# Patient Record
Sex: MALE | NOT HISPANIC OR LATINO | URBAN - METROPOLITAN AREA
[De-identification: names, ages, dates, MRNs, and addresses within clinical notes are randomized per-mention and may not be internally consistent; named-entity substitution may affect disease eponyms.]

---

## 2021-05-14 ENCOUNTER — IMMUNIZATIONS (OUTPATIENT)
Dept: FAMILY MEDICINE CLINIC | Facility: HOSPITAL | Age: 61
End: 2021-05-14

## 2021-05-14 DIAGNOSIS — Z23 ENCOUNTER FOR IMMUNIZATION: Primary | ICD-10-CM

## 2021-05-14 PROCEDURE — 91301 SARS-COV-2 / COVID-19 MRNA VACCINE (MODERNA) 100 MCG: CPT

## 2021-05-14 PROCEDURE — 0011A SARS-COV-2 / COVID-19 MRNA VACCINE (MODERNA) 100 MCG: CPT

## 2021-06-09 ENCOUNTER — IMMUNIZATIONS (OUTPATIENT)
Dept: FAMILY MEDICINE CLINIC | Facility: HOSPITAL | Age: 61
End: 2021-06-09

## 2021-06-09 DIAGNOSIS — Z23 ENCOUNTER FOR IMMUNIZATION: Primary | ICD-10-CM

## 2021-06-09 PROCEDURE — 91301 SARS-COV-2 / COVID-19 MRNA VACCINE (MODERNA) 100 MCG: CPT

## 2021-06-09 PROCEDURE — 0012A SARS-COV-2 / COVID-19 MRNA VACCINE (MODERNA) 100 MCG: CPT

## 2024-06-30 ENCOUNTER — HOSPITAL ENCOUNTER (INPATIENT)
Facility: HOSPITAL | Age: 64
LOS: 3 days | Discharge: HOME | DRG: 174 | End: 2024-07-03
Attending: EMERGENCY MEDICINE | Admitting: INTERNAL MEDICINE
Payer: COMMERCIAL

## 2024-06-30 ENCOUNTER — APPOINTMENT (EMERGENCY)
Dept: RADIOLOGY | Facility: HOSPITAL | Age: 64
DRG: 174 | End: 2024-06-30
Payer: COMMERCIAL

## 2024-06-30 DIAGNOSIS — I21.3 STEMI (ST ELEVATION MYOCARDIAL INFARCTION) (HCC): Primary | ICD-10-CM

## 2024-06-30 PROBLEM — I21.09 ACUTE MI ANTERIOR WALL FIRST EPISODE CARE (HCC): Status: ACTIVE | Noted: 2024-06-30

## 2024-06-30 LAB
2HR DELTA HS TROPONIN: ABNORMAL NG/L
ANION GAP SERPL CALCULATED.3IONS-SCNC: 8 MMOL/L (ref 4–13)
APTT PPP: 30 SECONDS (ref 23–37)
ATRIAL RATE: 64 BPM
ATRIAL RATE: 65 BPM
ATRIAL RATE: 67 BPM
BASOPHILS # BLD AUTO: 0.05 THOUSANDS/ÂΜL (ref 0–0.1)
BASOPHILS NFR BLD AUTO: 1 % (ref 0–1)
BUN SERPL-MCNC: 10 MG/DL (ref 5–25)
CALCIUM SERPL-MCNC: 9.8 MG/DL (ref 8.4–10.2)
CARDIAC TROPONIN I PNL SERPL HS: 111 NG/L
CARDIAC TROPONIN I PNL SERPL HS: ABNORMAL NG/L
CHLORIDE SERPL-SCNC: 104 MMOL/L (ref 96–108)
CHOLEST SERPL-MCNC: 232 MG/DL
CO2 SERPL-SCNC: 28 MMOL/L (ref 21–32)
CREAT SERPL-MCNC: 1.12 MG/DL (ref 0.6–1.3)
EOSINOPHIL # BLD AUTO: 0.03 THOUSAND/ÂΜL (ref 0–0.61)
EOSINOPHIL NFR BLD AUTO: 0 % (ref 0–6)
ERYTHROCYTE [DISTWIDTH] IN BLOOD BY AUTOMATED COUNT: 11 % (ref 11.6–15.1)
GFR SERPL CREATININE-BSD FRML MDRD: 69 ML/MIN/1.73SQ M
GLUCOSE SERPL-MCNC: 132 MG/DL (ref 65–140)
HCT VFR BLD AUTO: 46.9 % (ref 36.5–49.3)
HDLC SERPL-MCNC: 60 MG/DL
HGB BLD-MCNC: 16 G/DL (ref 12–17)
IMM GRANULOCYTES # BLD AUTO: 0.02 THOUSAND/UL (ref 0–0.2)
IMM GRANULOCYTES NFR BLD AUTO: 0 % (ref 0–2)
INR PPP: 1.03 (ref 0.84–1.19)
KCT BLD-ACNC: 229 SEC (ref 89–137)
KCT BLD-ACNC: 261 SEC (ref 89–137)
LDLC SERPL CALC-MCNC: 147 MG/DL (ref 0–100)
LYMPHOCYTES # BLD AUTO: 2.97 THOUSANDS/ÂΜL (ref 0.6–4.47)
LYMPHOCYTES NFR BLD AUTO: 38 % (ref 14–44)
MAGNESIUM SERPL-MCNC: 2 MG/DL (ref 1.9–2.7)
MCH RBC QN AUTO: 33.1 PG (ref 26.8–34.3)
MCHC RBC AUTO-ENTMCNC: 34.1 G/DL (ref 31.4–37.4)
MCV RBC AUTO: 97 FL (ref 82–98)
MONOCYTES # BLD AUTO: 0.67 THOUSAND/ÂΜL (ref 0.17–1.22)
MONOCYTES NFR BLD AUTO: 9 % (ref 4–12)
NEUTROPHILS # BLD AUTO: 4.15 THOUSANDS/ÂΜL (ref 1.85–7.62)
NEUTS SEG NFR BLD AUTO: 52 % (ref 43–75)
NONHDLC SERPL-MCNC: 172 MG/DL
NRBC BLD AUTO-RTO: 0 /100 WBCS
P AXIS: 68 DEGREES
P AXIS: 73 DEGREES
P AXIS: 77 DEGREES
PLATELET # BLD AUTO: 329 THOUSANDS/UL (ref 149–390)
PMV BLD AUTO: 9.9 FL (ref 8.9–12.7)
POTASSIUM SERPL-SCNC: 3.2 MMOL/L (ref 3.5–5.3)
PR INTERVAL: 152 MS
PR INTERVAL: 156 MS
PR INTERVAL: 167 MS
PROTHROMBIN TIME: 13.7 SECONDS (ref 11.6–14.5)
QRS AXIS: 35 DEGREES
QRS AXIS: 46 DEGREES
QRS AXIS: 85 DEGREES
QRSD INTERVAL: 84 MS
QRSD INTERVAL: 84 MS
QRSD INTERVAL: 88 MS
QT INTERVAL: 388 MS
QT INTERVAL: 392 MS
QT INTERVAL: 400 MS
QTC INTERVAL: 403 MS
QTC INTERVAL: 404 MS
QTC INTERVAL: 423 MS
RBC # BLD AUTO: 4.84 MILLION/UL (ref 3.88–5.62)
SODIUM SERPL-SCNC: 140 MMOL/L (ref 135–147)
SPECIMEN SOURCE: ABNORMAL
SPECIMEN SOURCE: ABNORMAL
T WAVE AXIS: 15 DEGREES
T WAVE AXIS: 25 DEGREES
T WAVE AXIS: 67 DEGREES
TRIGL SERPL-MCNC: 124 MG/DL
VENTRICULAR RATE: 64 BPM
VENTRICULAR RATE: 65 BPM
VENTRICULAR RATE: 67 BPM
WBC # BLD AUTO: 7.89 THOUSAND/UL (ref 4.31–10.16)

## 2024-06-30 PROCEDURE — 93010 ELECTROCARDIOGRAM REPORT: CPT | Performed by: INTERNAL MEDICINE

## 2024-06-30 PROCEDURE — C1769 GUIDE WIRE: HCPCS | Performed by: INTERNAL MEDICINE

## 2024-06-30 PROCEDURE — 36415 COLL VENOUS BLD VENIPUNCTURE: CPT

## 2024-06-30 PROCEDURE — 93458 L HRT ARTERY/VENTRICLE ANGIO: CPT | Performed by: INTERNAL MEDICINE

## 2024-06-30 PROCEDURE — 71045 X-RAY EXAM CHEST 1 VIEW: CPT

## 2024-06-30 PROCEDURE — 92941 PRQ TRLML REVSC TOT OCCL AMI: CPT | Performed by: INTERNAL MEDICINE

## 2024-06-30 PROCEDURE — 93005 ELECTROCARDIOGRAM TRACING: CPT

## 2024-06-30 PROCEDURE — C1725 CATH, TRANSLUMIN NON-LASER: HCPCS | Performed by: INTERNAL MEDICINE

## 2024-06-30 PROCEDURE — 96375 TX/PRO/DX INJ NEW DRUG ADDON: CPT

## 2024-06-30 PROCEDURE — 96374 THER/PROPH/DIAG INJ IV PUSH: CPT

## 2024-06-30 PROCEDURE — C9606 PERC D-E COR REVASC W AMI S: HCPCS | Performed by: INTERNAL MEDICINE

## 2024-06-30 PROCEDURE — 99223 1ST HOSP IP/OBS HIGH 75: CPT | Performed by: NURSE PRACTITIONER

## 2024-06-30 PROCEDURE — 85347 COAGULATION TIME ACTIVATED: CPT

## 2024-06-30 PROCEDURE — 99152 MOD SED SAME PHYS/QHP 5/>YRS: CPT | Performed by: INTERNAL MEDICINE

## 2024-06-30 PROCEDURE — 92978 ENDOLUMINL IVUS OCT C 1ST: CPT | Performed by: INTERNAL MEDICINE

## 2024-06-30 PROCEDURE — 83735 ASSAY OF MAGNESIUM: CPT

## 2024-06-30 PROCEDURE — C1753 CATH, INTRAVAS ULTRASOUND: HCPCS | Performed by: INTERNAL MEDICINE

## 2024-06-30 PROCEDURE — 85025 COMPLETE CBC W/AUTO DIFF WBC: CPT

## 2024-06-30 PROCEDURE — 96361 HYDRATE IV INFUSION ADD-ON: CPT

## 2024-06-30 PROCEDURE — 99285 EMERGENCY DEPT VISIT HI MDM: CPT

## 2024-06-30 PROCEDURE — C1894 INTRO/SHEATH, NON-LASER: HCPCS | Performed by: INTERNAL MEDICINE

## 2024-06-30 PROCEDURE — 85610 PROTHROMBIN TIME: CPT

## 2024-06-30 PROCEDURE — B2111ZZ FLUOROSCOPY OF MULTIPLE CORONARY ARTERIES USING LOW OSMOLAR CONTRAST: ICD-10-PCS | Performed by: INTERNAL MEDICINE

## 2024-06-30 PROCEDURE — C1874 STENT, COATED/COV W/DEL SYS: HCPCS | Performed by: INTERNAL MEDICINE

## 2024-06-30 PROCEDURE — 4A023N7 MEASUREMENT OF CARDIAC SAMPLING AND PRESSURE, LEFT HEART, PERCUTANEOUS APPROACH: ICD-10-PCS | Performed by: INTERNAL MEDICINE

## 2024-06-30 PROCEDURE — 99285 EMERGENCY DEPT VISIT HI MDM: CPT | Performed by: EMERGENCY MEDICINE

## 2024-06-30 PROCEDURE — 80061 LIPID PANEL: CPT

## 2024-06-30 PROCEDURE — 027034Z DILATION OF CORONARY ARTERY, ONE ARTERY WITH DRUG-ELUTING INTRALUMINAL DEVICE, PERCUTANEOUS APPROACH: ICD-10-PCS | Performed by: INTERNAL MEDICINE

## 2024-06-30 PROCEDURE — 80048 BASIC METABOLIC PNL TOTAL CA: CPT

## 2024-06-30 PROCEDURE — C1887 CATHETER, GUIDING: HCPCS | Performed by: INTERNAL MEDICINE

## 2024-06-30 PROCEDURE — 99153 MOD SED SAME PHYS/QHP EA: CPT | Performed by: INTERNAL MEDICINE

## 2024-06-30 PROCEDURE — 84484 ASSAY OF TROPONIN QUANT: CPT

## 2024-06-30 PROCEDURE — 99255 IP/OBS CONSLTJ NEW/EST HI 80: CPT | Performed by: INTERNAL MEDICINE

## 2024-06-30 PROCEDURE — 85730 THROMBOPLASTIN TIME PARTIAL: CPT

## 2024-06-30 DEVICE — STENT ONYXNG30026UX ONYX 3.00X26RX
Type: IMPLANTABLE DEVICE | Site: CORONARY | Status: FUNCTIONAL
Brand: ONYX FRONTIER™

## 2024-06-30 RX ORDER — SODIUM CHLORIDE 9 MG/ML
50 INJECTION, SOLUTION INTRAVENOUS CONTINUOUS
Status: DISCONTINUED | OUTPATIENT
Start: 2024-06-30 | End: 2024-06-30

## 2024-06-30 RX ORDER — CHLORHEXIDINE GLUCONATE ORAL RINSE 1.2 MG/ML
15 SOLUTION DENTAL EVERY 12 HOURS SCHEDULED
Status: DISCONTINUED | OUTPATIENT
Start: 2024-06-30 | End: 2024-07-01

## 2024-06-30 RX ORDER — SODIUM CHLORIDE 9 MG/ML
3 INJECTION INTRAVENOUS
Status: DISCONTINUED | OUTPATIENT
Start: 2024-06-30 | End: 2024-07-03 | Stop reason: HOSPADM

## 2024-06-30 RX ORDER — MAGNESIUM HYDROXIDE/ALUMINUM HYDROXICE/SIMETHICONE 120; 1200; 1200 MG/30ML; MG/30ML; MG/30ML
30 SUSPENSION ORAL EVERY 6 HOURS PRN
Status: DISCONTINUED | OUTPATIENT
Start: 2024-06-30 | End: 2024-07-03 | Stop reason: HOSPADM

## 2024-06-30 RX ORDER — VERAPAMIL HCL 2.5 MG/ML
AMPUL (ML) INTRAVENOUS CODE/TRAUMA/SEDATION MEDICATION
Status: DISCONTINUED | OUTPATIENT
Start: 2024-06-30 | End: 2024-06-30 | Stop reason: HOSPADM

## 2024-06-30 RX ORDER — ONDANSETRON 2 MG/ML
4 INJECTION INTRAMUSCULAR; INTRAVENOUS EVERY 6 HOURS PRN
Status: DISCONTINUED | OUTPATIENT
Start: 2024-06-30 | End: 2024-07-03 | Stop reason: HOSPADM

## 2024-06-30 RX ORDER — SODIUM CHLORIDE 9 MG/ML
75 INJECTION, SOLUTION INTRAVENOUS CONTINUOUS
Status: DISCONTINUED | OUTPATIENT
Start: 2024-06-30 | End: 2024-06-30

## 2024-06-30 RX ORDER — MORPHINE SULFATE 4 MG/ML
4 INJECTION, SOLUTION INTRAMUSCULAR; INTRAVENOUS ONCE
Status: COMPLETED | OUTPATIENT
Start: 2024-06-30 | End: 2024-06-30

## 2024-06-30 RX ORDER — LABETALOL HYDROCHLORIDE 5 MG/ML
INJECTION, SOLUTION INTRAVENOUS
Status: COMPLETED
Start: 2024-06-30 | End: 2024-06-30

## 2024-06-30 RX ORDER — NITROGLYCERIN 20 MG/100ML
INJECTION INTRAVENOUS
Status: COMPLETED | OUTPATIENT
Start: 2024-06-30 | End: 2024-06-30

## 2024-06-30 RX ORDER — NITROGLYCERIN 0.4 MG/1
0.4 TABLET SUBLINGUAL
Status: DISCONTINUED | OUTPATIENT
Start: 2024-06-30 | End: 2024-07-03 | Stop reason: HOSPADM

## 2024-06-30 RX ORDER — ASPIRIN 81 MG/1
81 TABLET, CHEWABLE ORAL DAILY
Status: DISCONTINUED | OUTPATIENT
Start: 2024-07-01 | End: 2024-06-30

## 2024-06-30 RX ORDER — METOPROLOL TARTRATE 1 MG/ML
5 INJECTION, SOLUTION INTRAVENOUS ONCE
Status: COMPLETED | OUTPATIENT
Start: 2024-06-30 | End: 2024-06-30

## 2024-06-30 RX ORDER — HEPARIN SODIUM 1000 [USP'U]/ML
INJECTION, SOLUTION INTRAVENOUS; SUBCUTANEOUS CODE/TRAUMA/SEDATION MEDICATION
Status: DISCONTINUED | OUTPATIENT
Start: 2024-06-30 | End: 2024-06-30 | Stop reason: HOSPADM

## 2024-06-30 RX ORDER — FENTANYL CITRATE 50 UG/ML
INJECTION, SOLUTION INTRAMUSCULAR; INTRAVENOUS CODE/TRAUMA/SEDATION MEDICATION
Status: DISCONTINUED | OUTPATIENT
Start: 2024-06-30 | End: 2024-06-30 | Stop reason: HOSPADM

## 2024-06-30 RX ORDER — LABETALOL HYDROCHLORIDE 5 MG/ML
10 INJECTION, SOLUTION INTRAVENOUS EVERY 6 HOURS PRN
Status: DISCONTINUED | OUTPATIENT
Start: 2024-06-30 | End: 2024-07-03 | Stop reason: HOSPADM

## 2024-06-30 RX ORDER — LIDOCAINE HYDROCHLORIDE 10 MG/ML
INJECTION, SOLUTION EPIDURAL; INFILTRATION; INTRACAUDAL; PERINEURAL CODE/TRAUMA/SEDATION MEDICATION
Status: DISCONTINUED | OUTPATIENT
Start: 2024-06-30 | End: 2024-06-30 | Stop reason: HOSPADM

## 2024-06-30 RX ORDER — NITROGLYCERIN 20 MG/100ML
INJECTION INTRAVENOUS CODE/TRAUMA/SEDATION MEDICATION
Status: DISCONTINUED | OUTPATIENT
Start: 2024-06-30 | End: 2024-06-30 | Stop reason: HOSPADM

## 2024-06-30 RX ORDER — POTASSIUM CHLORIDE 20 MEQ/1
20 TABLET, EXTENDED RELEASE ORAL ONCE
Status: COMPLETED | OUTPATIENT
Start: 2024-06-30 | End: 2024-06-30

## 2024-06-30 RX ORDER — FUROSEMIDE 10 MG/ML
INJECTION INTRAMUSCULAR; INTRAVENOUS CODE/TRAUMA/SEDATION MEDICATION
Status: DISCONTINUED | OUTPATIENT
Start: 2024-06-30 | End: 2024-06-30 | Stop reason: HOSPADM

## 2024-06-30 RX ORDER — ACETAMINOPHEN 325 MG/1
650 TABLET ORAL EVERY 8 HOURS PRN
Status: DISCONTINUED | OUTPATIENT
Start: 2024-06-30 | End: 2024-07-01

## 2024-06-30 RX ORDER — HEPARIN SODIUM 1000 [USP'U]/ML
4000 INJECTION, SOLUTION INTRAVENOUS; SUBCUTANEOUS ONCE
Status: COMPLETED | OUTPATIENT
Start: 2024-06-30 | End: 2024-06-30

## 2024-06-30 RX ORDER — ATORVASTATIN CALCIUM 80 MG/1
80 TABLET, FILM COATED ORAL DAILY
Status: DISCONTINUED | OUTPATIENT
Start: 2024-06-30 | End: 2024-07-03 | Stop reason: HOSPADM

## 2024-06-30 RX ORDER — MIDAZOLAM HYDROCHLORIDE 2 MG/2ML
INJECTION, SOLUTION INTRAMUSCULAR; INTRAVENOUS CODE/TRAUMA/SEDATION MEDICATION
Status: DISCONTINUED | OUTPATIENT
Start: 2024-06-30 | End: 2024-06-30 | Stop reason: HOSPADM

## 2024-06-30 RX ADMIN — METOROPROLOL TARTRATE 5 MG: 5 INJECTION, SOLUTION INTRAVENOUS at 17:46

## 2024-06-30 RX ADMIN — HEPARIN SODIUM 4000 UNITS: 1000 INJECTION INTRAVENOUS; SUBCUTANEOUS at 14:43

## 2024-06-30 RX ADMIN — SODIUM CHLORIDE 75 ML/HR: 0.9 INJECTION, SOLUTION INTRAVENOUS at 14:47

## 2024-06-30 RX ADMIN — TICAGRELOR 90 MG: 90 TABLET ORAL at 21:07

## 2024-06-30 RX ADMIN — POTASSIUM CHLORIDE 20 MEQ: 1500 TABLET, EXTENDED RELEASE ORAL at 17:46

## 2024-06-30 RX ADMIN — TICAGRELOR 180 MG: 90 TABLET ORAL at 14:44

## 2024-06-30 RX ADMIN — LABETALOL HYDROCHLORIDE 10 MG: 5 INJECTION, SOLUTION INTRAVENOUS at 23:17

## 2024-06-30 RX ADMIN — SODIUM CHLORIDE 50 ML/HR: 0.9 INJECTION, SOLUTION INTRAVENOUS at 17:30

## 2024-06-30 RX ADMIN — CHLORHEXIDINE GLUCONATE 15 ML: 1.2 RINSE ORAL at 21:08

## 2024-06-30 RX ADMIN — MORPHINE SULFATE 4 MG: 4 INJECTION INTRAVENOUS at 14:58

## 2024-06-30 RX ADMIN — METOPROLOL TARTRATE 25 MG: 25 TABLET, FILM COATED ORAL at 21:08

## 2024-06-30 RX ADMIN — ATORVASTATIN CALCIUM 80 MG: 80 TABLET, FILM COATED ORAL at 17:46

## 2024-06-30 NOTE — PROGRESS NOTES
Patient prepared for transfer via stretcher on the monitor to ICU-1.  Verbal report to be called to RN.  Patient offering no complaints at this time.  Right wrist dry and intact with 12ml of air in the band.  Site to be verified with receiving RN. Any changes from the above assessment will be documented by receiving RN.

## 2024-06-30 NOTE — PLAN OF CARE
Problem: PAIN - ADULT  Goal: Verbalizes/displays adequate comfort level or baseline comfort level  Description: Interventions:  - Encourage patient to monitor pain and request assistance  - Assess pain using appropriate pain scale  - Administer analgesics based on type and severity of pain and evaluate response  - Implement non-pharmacological measures as appropriate and evaluate response  - Consider cultural and social influences on pain and pain management  - Notify physician/advanced practitioner if interventions unsuccessful or patient reports new pain  Outcome: Progressing     Problem: INFECTION - ADULT  Goal: Absence or prevention of progression during hospitalization  Description: INTERVENTIONS:  - Assess and monitor for signs and symptoms of infection  - Monitor lab/diagnostic results  - Monitor all insertion sites, i.e. indwelling lines, tubes, and drains  - Monitor endotracheal if appropriate and nasal secretions for changes in amount and color  - [place] appropriate cooling/warming therapies per order  - Administer medications as ordered  - Instruct and encourage patient and family to use good hand hygiene technique  - Identify and instruct in appropriate isolation precautions for identified infection/condition  Outcome: Progressing  Goal: Absence of fever/infection during neutropenic period  Description: INTERVENTIONS:  - Monitor WBC    Outcome: Progressing     Problem: SAFETY ADULT  Goal: Patient will remain free of falls  Description: INTERVENTIONS:  - Educate patient/family on patient safety including physical limitations  - Instruct patient to call for assistance with activity   - Consult OT/PT to assist with strengthening/mobility   - Keep Call bell within reach  - Keep bed low and locked with side rails adjusted as appropriate  - Keep care items and personal belongings within reach  - Initiate and maintain comfort rounds  - Make Fall Risk Sign visible to staff  - Offer Toileting every 2 Hours,  in advance of need  - Initiate/Maintain bed alarm  - Obtain necessary fall risk management equipment:   - Apply yellow socks and bracelet for high fall risk patients  - Consider moving patient to room near nurses station  Outcome: Progressing  Goal: Maintain or return to baseline ADL function  Description: INTERVENTIONS:  -  Assess patient's ability to carry out ADLs; assess patient's baseline for ADL function and identify physical deficits which impact ability to perform ADLs (bathing, care of mouth/teeth, toileting, grooming, dressing, etc.)  - Assess/evaluate cause of self-care deficits   - Assess range of motion  - Assess patient's mobility; develop plan if impaired  - Assess patient's need for assistive devices and provide as appropriate  - Encourage maximum independence but intervene and supervise when necessary  - Involve family in performance of ADLs  - Assess for home care needs following discharge   - Consider OT consult to assist with ADL evaluation and planning for discharge  - Provide patient education as appropriate  Outcome: Progressing  Goal: Maintains/Returns to pre admission functional level  Description: INTERVENTIONS:  - Perform AM-PAC 6 Click Basic Mobility/ Daily Activity assessment daily.  - Set and communicate daily mobility goal to care team and patient/family/caregiver.   - Collaborate with rehabilitation services on mobility goals if consulted  - Perform Range of Motion 4 times a day.  - Reposition patient every 2 hours.  - Dangle patient 3 times a day  - Stand patient 3 times a day  - Ambulate patient 3 times a day  - Out of bed to chair 3 times a day   - Out of bed for meals 3 times a day  - Out of bed for toileting  - Record patient progress and toleration of activity level   Outcome: Progressing     Problem: DISCHARGE PLANNING  Goal: Discharge to home or other facility with appropriate resources  Description: INTERVENTIONS:  - Identify barriers to discharge w/patient and caregiver  -  Arrange for needed discharge resources and transportation as appropriate  - Identify discharge learning needs (meds, wound care, etc.)  - Arrange for interpretive services to assist at discharge as needed  - Refer to Case Management Department for coordinating discharge planning if the patient needs post-hospital services based on physician/advanced practitioner order or complex needs related to functional status, cognitive ability, or social support system  Outcome: Progressing     Problem: Knowledge Deficit  Goal: Patient/family/caregiver demonstrates understanding of disease process, treatment plan, medications, and discharge instructions  Description: Complete learning assessment and assess knowledge base.  Interventions:  - Provide teaching at level of understanding  - Provide teaching via preferred learning methods  Outcome: Progressing

## 2024-06-30 NOTE — H&P
[facility]  H&P  Name: Nolan Escobar 64 y.o. male I MRN: [id number]  Unit/Bed#: ICU 01 I Date of Admission: 6/30/2024   Date of Service: 6/30/2024 I Hospital Day: 0      Assessment & Plan   Acute MI anterior wall first episode care (HCC)  Assessment & Plan  Chest pressure after syncopal episode  ST elevation in the anterior leads  STEMI alert called  Patient was taken to the cardiac catheterization lab  He had a 100% proximal left anterior descending artery occlusion  PTCA was performed and drug-eluting stent deployed with flow restored  Admit patient to the intensive care unit  Continuous cardiopulmonary monitoring  Continue nitroglycerin drip  EKG with chest pain/pressure  Cardiology consult   TR band on right wrist with adequate hemostasis             History of Present Illness     HPI: Nolan Escobar is a 64 y.o. Emir[place]-speaking man with no past medical history who had an episode of chest pain at home today.  He then had a syncopal episode at home.  EMS was activated and the patient was transported to [facility] emergency department.  Upon arrival to the emergency department the patient was found to have ST elevations in his anterior leads with reciprocal ST depression.  He denied complaints of nausea, vomiting, dizziness, headache, visual disturbances, lower extremity edema.  Diaphoresis but did complain of chest discomfort.  A MI alert was called and the patient was taken to the cardiac catheterization lab where he was found to have a 100% proximal occlusion of the left anterior descending artery.  A drug-eluting stent was deployed and the patient was transferred to the intensive care unit.    Patient family members are in the room with him now and speak English and Emir[place].  Information is obtained through his family members.  Patient has no history of smoking.  He occasionally drinks alcohol but not often.  He has no past medical history he has been  healthy all of his life up until now.  He does not have a doctor here in [place] he recently moved here from [place].  He did not see a physician in the [place].    Patient arrives from the cardiac catheterization lab on a nitro drip mildly hypotensive therefore the drip was discontinued.  He denies any further complaints of chest pain.  He is having some difficulty urinating.  This may be secondary to his family member surrounding him in the bed he may need some privacy to urinate.    History obtained from the patient.  With family members acting as  since  Review of Systems: See HPI for Review of Systems  Disposition: Critical care  Historical Information   No past medical history on file. No past surgical history on file.   No current outpatient medications No Known Allergies     No family history on file.       Objective                            Vitals I/O      Most Recent Min/Max in 24hrs   Temp 97.6 °F (36.4 °C) Temp  Min: 97.6 °F (36.4 °C)  Max: 98.4 °F (36.9 °C)   Pulse 68 Pulse  Min: 62  Max: 68   Resp 18 Resp  Min: 18  Max: 24   /97 BP  Min: 119/85  Max: 180/110   O2 Sat 95 % SpO2  Min: 94 %  Max: 98 %    No intake or output data in the 24 hours ending 06/30/24 1710    No diet orders on file    Invasive Monitoring           Physical Exam   Physical Exam  Vitals reviewed.   Eyes:      General: Vision grossly intact.      Extraocular Movements: Extraocular movements intact.      Conjunctiva/sclera: Conjunctivae normal.      Pupils: Pupils are equal, round, and reactive to light.   Skin:     General: Skin is warm and dry.      Capillary Refill: Capillary refill takes less than 2 seconds.   HENT:      Head: Normocephalic and atraumatic.      Ears:      Comments: Ears are normal     Nose:      Comments: Nose is normal     Mouth/Throat:      Mouth: Mucous membranes are moist.   Neck:      Comments: Supple no JVD no lymphadenopathy trachea  midline  Cardiovascular:      Rate and Rhythm: Normal rate and regular rhythm.      Pulses: Normal pulses.      Heart sounds: Normal heart sounds.   Musculoskeletal:         General: Normal range of motion.   Abdominal: General: Bowel sounds are normal.      Palpations: Abdomen is soft.   Constitutional:       Appearance: He is well-developed and well-nourished.   Pulmonary:      Effort: Pulmonary effort is normal.      Breath sounds: Normal breath sounds.   Neurological:      General: No focal deficit present.      Mental Status: He is alert and oriented to person, place and time. Mental status is at baseline. He is calm.      Sensory: Sensation is intact.      Motor: gross motor function is at baseline for patient. Strength full and intact in all extremities.        Corneal reflex present, cough reflex and gag reflex intact.            Diagnostic Studies      EKG: Sinus rhythm  Imaging: Images from the cardiac catheterization lab have been reviewed and I have personally reviewed pertinent reports.       Medications:  Scheduled PRN   atorvastatin, 80 mg, Daily  potassium chloride, 20 mEq, Once  [START ON 7/1/2024] ticagrelor, 90 mg, Q12H MATTEO      sodium chloride (PF), 3 mL, Q1H PRN       Continuous    sodium chloride, 75 mL/hr, Last Rate: 75 mL/hr (06/30/24 1447)         Labs:    CBC    Recent Labs     06/30/24  1446   WBC 7.89   HGB 16.0   HCT 46.9        BMP    Recent Labs     06/30/24  1446   SODIUM 140   K 3.2*      CO2 28   AGAP 8   BUN 10   CREATININE 1.12   CALCIUM 9.8       Coags    Recent Labs     06/30/24  1446   INR 1.03   PTT 30        Additional Electrolytes  Recent Labs     06/30/24  1446   MG 2.0          Blood Gas    No recent results  No recent results LFTs  No recent results    Infectious  No recent results  Glucose  Recent Labs     06/30/24  1446   GLUC 132             Anticipated Length of Stay is > 2 midnights  [place]

## 2024-06-30 NOTE — CONSULTS
Cardiology Consultation  MD Onel Wagner MD, [facility]  Michael Baugh DO, [facility]  MD Ester Laird DO, [facility]  Arnaldo Carlin DO, [facility]  ----------------------------------------------------------------  [address] [address]    Nolan Escobar 64 y.o. male MRN: [id number]  Unit/Bed#: ICU 01 Encounter: [id number]      Reason for Consultation: STEMI      ASSESSMENT:   Acute anterior ST segment elevation MI  Accelerated hypertension  Dyslipidemia w/ LDL 47 mg/dL, HDL 60 mg/dL,  mg/dL, June 2024  Hypokalemia    PLAN:  Patient has ST segment elevation MI  Recommend emergent cardiac catheterization  Give aspirin, high intensity statin, heparin and Brilinta  Risks, benefits and alternatives to cardiac catheterization have been discussed at length including the risk of bleeding, infection, renal failure, CVA, myocardial infarction and death; patient understands these risks and wishes to proceed.  N.p.o. for the procedure  Check 2D echocardiogram to assess cardiac structure and function  Postcatheterization, would assess feasibility of giving beta-blocker  Further recommendations to follow testing    Signed: Michael Baugh DO, [facility], [place]ORA GUERRERO      History of Present Illness:  Nolan Escobar is a 64 y.o. male with no significant past medical history presented with acute onset chest discomfort.  Symptoms began 1.5 hours prior to admission on June 30, 2024.  The discomfort was described as a pressure sensation in the mid sternum.  The symptoms were severe in nature.  Due to his symptoms, he presented to [facility] by ambulance.  The patient was found to have ST segment elevation in the anterolateral leads on ECG and MI alert was called. In the emergency department, the patient was given heparin bolus, aspirin, high intensity statin and loaded with Brilinta.  He continues to have active chest pain.  Blood  pressure was elevated.      Review of Systems:  Review of Systems   Constitutional: Negative for decreased appetite, fever, weight gain and weight loss.   HENT:  Negative for congestion and sore throat.    Eyes:  Negative for visual disturbance.   Cardiovascular:  Negative for chest pain, dyspnea on exertion, leg swelling, near-syncope and palpitations.   Respiratory:  Negative for cough and shortness of breath.    Hematologic/Lymphatic: Negative for bleeding problem.   Skin:  Negative for rash.   Musculoskeletal:  Negative for myalgias and neck pain.   Gastrointestinal:  Negative for abdominal pain and nausea.   Neurological:  Negative for light-headedness and weakness.   Psychiatric/Behavioral:  Negative for depression.          No past medical history on file.    No past surgical history on file.    Social History     Socioeconomic History    Marital status: Single     Spouse name: Not on file    Number of children: Not on file    Years of education: Not on file    Highest education level: Not on file   Occupational History    Not on file   Tobacco Use    Smoking status: Not on file    Smokeless tobacco: Not on file   Substance and Sexual Activity    Alcohol use: Not on file    Drug use: Not on file    Sexual activity: Not on file   Other Topics Concern    Not on file   Social History Narrative    Not on file     Social Determinants of Health     Financial Resource Strain: Not on file   Food Insecurity: Not on file   Transportation Needs: Not on file   Physical Activity: Not on file   Stress: Not on file   Social Connections: Not on file   Intimate Partner Violence: Not on file   Housing Stability: Not on file       No family history on file.    No Known Allergies    No current facility-administered medications on file prior to encounter.     No current outpatient medications on file prior to encounter.        Current Facility-Administered Medications   Medication Dose Route Frequency Provider Last Rate     "atorvastatin  80 mg Oral Daily Michael Baugh,       potassium chloride  20 mEq Oral Once Michael Baugh DO      sodium chloride (PF)  3 mL Intravenous Q1H PRN Oralia Galdamez MD      sodium chloride  75 mL/hr Intravenous Continuous Oralia Galdamez MD 75 mL/hr (06/30/24 1447)    [START ON 7/1/2024] ticagrelor  90 mg Oral Q12H MATTEO Oralia Galdamez MD         sodium chloride, 75 mL/hr, Last Rate: 75 mL/hr (06/30/24 1447)        Vitals:    06/30/24 1630 06/30/24 1639 06/30/24 1700 06/30/24 1715   BP: 119/85 144/97 (!) 149/108 (!) 162/112   BP Location: Left arm      Pulse: 68 68 66 64   Resp: 20 18 (!) 11 18   Temp: 97.6 °F (36.4 °C)      TempSrc: Oral      SpO2:  95% 98% 98%   Weight:  70.7 kg (155 lb 13.8 oz)     Height:  5' 10\" (1.778 m)         No intake/output data recorded.    No intake or output data in the 24 hours ending 06/30/24 1718    Weight change:     PHYSICAL EXAMINATION:  Gen: Awake, Alert, NAD  Head/eyes: AT/NC, pupils equal and round, Anicteric  ENT: mmm  Neck: Supple, No elevated JVP, trachea midline  Resp: CTA bilaterally no w/r/r  CV: RRR +S1, S2, No m/r/g  Abd: Soft, NT/ND + BS  Ext: no LE edema bilaterally  Neuro: Follows commands, moves all extermities  Psych: Appropriate affect, normal mood, pleasant attitude, non-combative  Skin: warm; no rash, erythema or venous stasis changes on exposed skin    Lab Results:  Results from last 7 days   Lab Units 06/30/24  1446   WBC Thousand/uL 7.89   HEMOGLOBIN g/dL 16.0   HEMATOCRIT % 46.9   PLATELETS Thousands/uL 329     Results from last 7 days   Lab Units 06/30/24  1446   POTASSIUM mmol/L 3.2*   CHLORIDE mmol/L 104   CO2 mmol/L 28   BUN mg/dL 10   CREATININE mg/dL 1.12   CALCIUM mg/dL 9.8     No results found for: \"TROPONINT\"      Results from last 7 days   Lab Units 06/30/24  1446   HS TNI 0HR ng/L 111*         Results from last 7 days   Lab Units 06/30/24  1446   INR  1.03           No results found for this or any previous " visit.    No results found for this or any previous visit.    No results found for this or any previous visit.    No results found for this or any previous visit.      CXR: No results found for this or any previous visit.    No results found for this or any previous visit.      ECG: Sinus rhythm 64 bpm with acute anterolateral injury with inferior reciprocal changes    This note was completed in part utilizing M-Modal Fluency Direct Software.  Grammatical errors, random word insertions, spelling mistakes, and incomplete sentences may be an occasional consequence of this system secondary to software limitations, ambient noise, and hardware issues.  If you have any questions or concerns about the content, text, or information contained within the body of this dictation, please contact the provider for clarification.

## 2024-06-30 NOTE — ED ATTENDING ATTESTATION
6/30/2024  I, Que Wagner MD, saw and evaluated the patient. I have discussed the patient with the resident/non-physician practitioner and agree with the resident's/non-physician practitioner's findings, Plan of Care, and MDM as documented in the resident's/non-physician practitioner's note, except where noted. All available labs and Radiology studies were reviewed.  I was present for key portions of any procedure(s) performed by the resident/non-physician practitioner and I was immediately available to provide assistance.       At this point I agree with the current assessment done in the Emergency Department.  I have conducted an independent evaluation of this patient a history and physical is as follows:  64-year-old Divehi-speaking male presents for evaluation of prehospital STEMI alert.  Patient had sudden onset of substernal chest pressure after having a syncopal episode.  No other complaints.  Patient was given aspirin and nitro prehospital.  Systems reviewed otherwise negative.  On exam no distress, lungs normal cardiac bilateral.  [id number] alert called, will send to Cath Lab, acute MI protocol  ED Course         Critical Care Time  Procedures

## 2024-06-30 NOTE — LETTER
[place] [id number]  [address] [facility] [address]  Dept: [phone]    July 3, 2024     Patient: Nolan Escobar   YOB: 1960   Date of Visit: 6/30/2024       To Whom it May Concern:    Nolan Escobar is under my professional care. He was seen in the hospital from 6/30/2024 to 07/03/24. He may possibly be able to return to work by 10/3/2024 - He is pending cardiac clearance and this will be addressed at next follow up      If you have any questions or concerns, please don't hesitate to call.         Sincerely,          Sly Alston, DO

## 2024-06-30 NOTE — LETTER
[place] [address]  [address] [facility] [address]  Dept: [phone]    July 3, 2024     Patient: Nolan Escobar   YOB: 1960   Date of Visit: 6/30/2024       To Whom it May Concern:    Nolan Escobar is under my professional care. He was seen in the hospital from 6/30/2024 to 07/03/24. He may return to work on 7/11/2024 without limitations.    If you have any questions or concerns, please don't hesitate to call.         Sincerely,      //Digital Signature//      Sly Alston DO

## 2024-06-30 NOTE — ASSESSMENT & PLAN NOTE
Chest pressure after syncopal episode  ST elevation in the anterior leads  STEMI alert called  Patient was taken to the cardiac catheterization lab  He had a 100% proximal left anterior descending artery occlusion  PTCA was performed and drug-eluting stent deployed with flow restored  Admit patient to the intensive care unit  Continuous cardiopulmonary monitoring  Continue nitroglycerin drip  EKG with chest pain/pressure  Cardiology consult   TR band on right wrist with adequate hemostasis

## 2024-07-01 ENCOUNTER — APPOINTMENT (INPATIENT)
Dept: NON INVASIVE DIAGNOSTICS | Facility: HOSPITAL | Age: 64
DRG: 174 | End: 2024-07-01
Payer: COMMERCIAL

## 2024-07-01 PROBLEM — I10 HYPERTENSION: Status: ACTIVE | Noted: 2024-07-01

## 2024-07-01 PROBLEM — E87.6 HYPOKALEMIA: Status: ACTIVE | Noted: 2024-07-01

## 2024-07-01 PROBLEM — I21.02 ACUTE ST ELEVATION MYOCARDIAL INFARCTION (STEMI) INVOLVING LEFT ANTERIOR DESCENDING (LAD) CORONARY ARTERY (HCC): Status: ACTIVE | Noted: 2024-06-30

## 2024-07-01 PROBLEM — E78.5 DYSLIPIDEMIA: Status: ACTIVE | Noted: 2024-07-01

## 2024-07-01 LAB
ANION GAP SERPL CALCULATED.3IONS-SCNC: 7 MMOL/L (ref 4–13)
ANION GAP SERPL CALCULATED.3IONS-SCNC: 8 MMOL/L (ref 4–13)
ATRIAL RATE: 89 BPM
BASOPHILS # BLD AUTO: 0.02 THOUSANDS/ÂΜL (ref 0–0.1)
BASOPHILS NFR BLD AUTO: 0 % (ref 0–1)
BSA FOR ECHO PROCEDURE: 1.87 M2
BUN SERPL-MCNC: 11 MG/DL (ref 5–25)
BUN SERPL-MCNC: 16 MG/DL (ref 5–25)
CA-I BLD-SCNC: 1.17 MMOL/L (ref 1.12–1.32)
CA-I BLD-SCNC: 1.21 MMOL/L (ref 1.12–1.32)
CALCIUM SERPL-MCNC: 10.6 MG/DL (ref 8.4–10.2)
CALCIUM SERPL-MCNC: 6.4 MG/DL (ref 8.4–10.2)
CARDIAC TROPONIN I PNL SERPL HS: ABNORMAL NG/L (ref 8–18)
CHLORIDE SERPL-SCNC: 114 MMOL/L (ref 96–108)
CHLORIDE SERPL-SCNC: 99 MMOL/L (ref 96–108)
CO2 SERPL-SCNC: 18 MMOL/L (ref 21–32)
CO2 SERPL-SCNC: 28 MMOL/L (ref 21–32)
CREAT SERPL-MCNC: 0.62 MG/DL (ref 0.6–1.3)
CREAT SERPL-MCNC: 0.83 MG/DL (ref 0.6–1.3)
EOSINOPHIL # BLD AUTO: 0 THOUSAND/ÂΜL (ref 0–0.61)
EOSINOPHIL NFR BLD AUTO: 0 % (ref 0–6)
ERYTHROCYTE [DISTWIDTH] IN BLOOD BY AUTOMATED COUNT: 11.3 % (ref 11.6–15.1)
GFR SERPL CREATININE-BSD FRML MDRD: 104 ML/MIN/1.73SQ M
GFR SERPL CREATININE-BSD FRML MDRD: 92 ML/MIN/1.73SQ M
GLOBAL LONGITUIDAL STRAIN: -6 %
GLUCOSE SERPL-MCNC: 129 MG/DL (ref 65–140)
GLUCOSE SERPL-MCNC: 129 MG/DL (ref 65–140)
HCT VFR BLD AUTO: 48.4 % (ref 36.5–49.3)
HGB BLD-MCNC: 16.4 G/DL (ref 12–17)
IMM GRANULOCYTES # BLD AUTO: 0.06 THOUSAND/UL (ref 0–0.2)
IMM GRANULOCYTES NFR BLD AUTO: 0 % (ref 0–2)
LYMPHOCYTES # BLD AUTO: 1.22 THOUSANDS/ÂΜL (ref 0.6–4.47)
LYMPHOCYTES NFR BLD AUTO: 8 % (ref 14–44)
MAGNESIUM SERPL-MCNC: 1.3 MG/DL (ref 1.9–2.7)
MAGNESIUM SERPL-MCNC: 3.3 MG/DL (ref 1.9–2.7)
MCH RBC QN AUTO: 32.7 PG (ref 26.8–34.3)
MCHC RBC AUTO-ENTMCNC: 33.9 G/DL (ref 31.4–37.4)
MCV RBC AUTO: 96 FL (ref 82–98)
MONOCYTES # BLD AUTO: 0.75 THOUSAND/ÂΜL (ref 0.17–1.22)
MONOCYTES NFR BLD AUTO: 5 % (ref 4–12)
NEUTROPHILS # BLD AUTO: 13.07 THOUSANDS/ÂΜL (ref 1.85–7.62)
NEUTS SEG NFR BLD AUTO: 87 % (ref 43–75)
NRBC BLD AUTO-RTO: 0 /100 WBCS
P AXIS: 51 DEGREES
PHOSPHATE SERPL-MCNC: 2.1 MG/DL (ref 2.3–4.1)
PHOSPHATE SERPL-MCNC: 2.8 MG/DL (ref 2.3–4.1)
PLATELET # BLD AUTO: 356 THOUSANDS/UL (ref 149–390)
PMV BLD AUTO: 10.3 FL (ref 8.9–12.7)
POTASSIUM SERPL-SCNC: 3 MMOL/L (ref 3.5–5.3)
POTASSIUM SERPL-SCNC: 4.9 MMOL/L (ref 3.5–5.3)
PR INTERVAL: 146 MS
QRS AXIS: 69 DEGREES
QRSD INTERVAL: 83 MS
QT INTERVAL: 338 MS
QTC INTERVAL: 412 MS
RBC # BLD AUTO: 5.02 MILLION/UL (ref 3.88–5.62)
SL CV LV EF: 31
SODIUM SERPL-SCNC: 135 MMOL/L (ref 135–147)
SODIUM SERPL-SCNC: 139 MMOL/L (ref 135–147)
T WAVE AXIS: 57 DEGREES
VENTRICULAR RATE: 89 BPM
WBC # BLD AUTO: 15.12 THOUSAND/UL (ref 4.31–10.16)

## 2024-07-01 PROCEDURE — 85025 COMPLETE CBC W/AUTO DIFF WBC: CPT | Performed by: NURSE PRACTITIONER

## 2024-07-01 PROCEDURE — 93306 TTE W/DOPPLER COMPLETE: CPT

## 2024-07-01 PROCEDURE — 99232 SBSQ HOSP IP/OBS MODERATE 35: CPT | Performed by: INTERNAL MEDICINE

## 2024-07-01 PROCEDURE — 93356 MYOCRD STRAIN IMG SPCKL TRCK: CPT

## 2024-07-01 PROCEDURE — NC001 PR NO CHARGE: Performed by: INTERNAL MEDICINE

## 2024-07-01 PROCEDURE — 93010 ELECTROCARDIOGRAM REPORT: CPT | Performed by: INTERNAL MEDICINE

## 2024-07-01 PROCEDURE — 80048 BASIC METABOLIC PNL TOTAL CA: CPT | Performed by: INTERNAL MEDICINE

## 2024-07-01 PROCEDURE — 83735 ASSAY OF MAGNESIUM: CPT

## 2024-07-01 PROCEDURE — 84484 ASSAY OF TROPONIN QUANT: CPT | Performed by: NURSE PRACTITIONER

## 2024-07-01 PROCEDURE — 80048 BASIC METABOLIC PNL TOTAL CA: CPT

## 2024-07-01 PROCEDURE — 83036 HEMOGLOBIN GLYCOSYLATED A1C: CPT

## 2024-07-01 PROCEDURE — 84100 ASSAY OF PHOSPHORUS: CPT

## 2024-07-01 PROCEDURE — 82330 ASSAY OF CALCIUM: CPT

## 2024-07-01 PROCEDURE — 93005 ELECTROCARDIOGRAM TRACING: CPT

## 2024-07-01 RX ORDER — MAGNESIUM SULFATE HEPTAHYDRATE 40 MG/ML
2 INJECTION, SOLUTION INTRAVENOUS ONCE
Status: COMPLETED | OUTPATIENT
Start: 2024-07-01 | End: 2024-07-01

## 2024-07-01 RX ORDER — POLYETHYLENE GLYCOL 3350 17 G/17G
17 POWDER, FOR SOLUTION ORAL DAILY
Status: DISCONTINUED | OUTPATIENT
Start: 2024-07-01 | End: 2024-07-03 | Stop reason: HOSPADM

## 2024-07-01 RX ORDER — ACETAMINOPHEN 325 MG/1
975 TABLET ORAL EVERY 6 HOURS PRN
Status: DISCONTINUED | OUTPATIENT
Start: 2024-07-01 | End: 2024-07-03 | Stop reason: HOSPADM

## 2024-07-01 RX ORDER — POTASSIUM CHLORIDE 14.9 MG/ML
20 INJECTION INTRAVENOUS ONCE
Status: DISCONTINUED | OUTPATIENT
Start: 2024-07-01 | End: 2024-07-01

## 2024-07-01 RX ORDER — LOSARTAN POTASSIUM 25 MG/1
25 TABLET ORAL DAILY
Status: DISCONTINUED | OUTPATIENT
Start: 2024-07-01 | End: 2024-07-03 | Stop reason: HOSPADM

## 2024-07-01 RX ORDER — AMLODIPINE BESYLATE 5 MG/1
5 TABLET ORAL DAILY
Status: DISCONTINUED | OUTPATIENT
Start: 2024-07-01 | End: 2024-07-03 | Stop reason: HOSPADM

## 2024-07-01 RX ORDER — POTASSIUM CHLORIDE 20 MEQ/1
40 TABLET, EXTENDED RELEASE ORAL ONCE
Status: COMPLETED | OUTPATIENT
Start: 2024-07-01 | End: 2024-07-01

## 2024-07-01 RX ORDER — AMLODIPINE BESYLATE 5 MG/1
5 TABLET ORAL DAILY
Status: DISCONTINUED | OUTPATIENT
Start: 2024-07-01 | End: 2024-07-01

## 2024-07-01 RX ORDER — METOPROLOL SUCCINATE 25 MG/1
25 TABLET, EXTENDED RELEASE ORAL 2 TIMES DAILY
Status: DISCONTINUED | OUTPATIENT
Start: 2024-07-01 | End: 2024-07-03 | Stop reason: HOSPADM

## 2024-07-01 RX ORDER — MORPHINE SULFATE 4 MG/ML
4 INJECTION, SOLUTION INTRAMUSCULAR; INTRAVENOUS ONCE
Status: COMPLETED | OUTPATIENT
Start: 2024-07-01 | End: 2024-07-01

## 2024-07-01 RX ORDER — LISINOPRIL 10 MG/1
10 TABLET ORAL DAILY
Status: DISCONTINUED | OUTPATIENT
Start: 2024-07-01 | End: 2024-07-01

## 2024-07-01 RX ORDER — COLCHICINE 0.6 MG/1
0.6 TABLET ORAL 2 TIMES DAILY
Status: DISCONTINUED | OUTPATIENT
Start: 2024-07-01 | End: 2024-07-03 | Stop reason: HOSPADM

## 2024-07-01 RX ORDER — LIDOCAINE 50 MG/G
1 PATCH TOPICAL DAILY
Status: DISCONTINUED | OUTPATIENT
Start: 2024-07-01 | End: 2024-07-03 | Stop reason: HOSPADM

## 2024-07-01 RX ORDER — CALCIUM GLUCONATE 20 MG/ML
2 INJECTION, SOLUTION INTRAVENOUS ONCE
Status: COMPLETED | OUTPATIENT
Start: 2024-07-01 | End: 2024-07-01

## 2024-07-01 RX ORDER — ENOXAPARIN SODIUM 100 MG/ML
40 INJECTION SUBCUTANEOUS
Status: DISCONTINUED | OUTPATIENT
Start: 2024-07-01 | End: 2024-07-03 | Stop reason: HOSPADM

## 2024-07-01 RX ORDER — MAGNESIUM SULFATE HEPTAHYDRATE 40 MG/ML
4 INJECTION, SOLUTION INTRAVENOUS ONCE
Status: COMPLETED | OUTPATIENT
Start: 2024-07-01 | End: 2024-07-01

## 2024-07-01 RX ORDER — POTASSIUM CHLORIDE 14.9 MG/ML
20 INJECTION INTRAVENOUS ONCE
Status: COMPLETED | OUTPATIENT
Start: 2024-07-01 | End: 2024-07-01

## 2024-07-01 RX ORDER — DOCUSATE SODIUM 100 MG/1
100 CAPSULE, LIQUID FILLED ORAL 2 TIMES DAILY
Status: DISCONTINUED | OUTPATIENT
Start: 2024-07-01 | End: 2024-07-03 | Stop reason: HOSPADM

## 2024-07-01 RX ADMIN — COLCHICINE 0.6 MG: 0.6 TABLET ORAL at 17:16

## 2024-07-01 RX ADMIN — LOSARTAN POTASSIUM 25 MG: 25 TABLET, FILM COATED ORAL at 10:40

## 2024-07-01 RX ADMIN — CHLORHEXIDINE GLUCONATE 15 ML: 1.2 RINSE ORAL at 08:47

## 2024-07-01 RX ADMIN — LIDOCAINE 1 PATCH: 700 PATCH TOPICAL at 08:47

## 2024-07-01 RX ADMIN — POTASSIUM CHLORIDE 40 MEQ: 1500 TABLET, EXTENDED RELEASE ORAL at 10:40

## 2024-07-01 RX ADMIN — AMLODIPINE BESYLATE 5 MG: 5 TABLET ORAL at 15:27

## 2024-07-01 RX ADMIN — METOPROLOL SUCCINATE 25 MG: 25 TABLET, FILM COATED, EXTENDED RELEASE ORAL at 20:20

## 2024-07-01 RX ADMIN — ACETAMINOPHEN 975 MG: 325 TABLET, FILM COATED ORAL at 22:32

## 2024-07-01 RX ADMIN — TICAGRELOR 90 MG: 90 TABLET ORAL at 20:20

## 2024-07-01 RX ADMIN — NITROGLYCERIN 0.4 MG: 0.4 TABLET SUBLINGUAL at 00:57

## 2024-07-01 RX ADMIN — POTASSIUM CHLORIDE 20 MEQ: 14.9 INJECTION, SOLUTION INTRAVENOUS at 08:47

## 2024-07-01 RX ADMIN — MAGNESIUM SULFATE HEPTAHYDRATE 2 G: 40 INJECTION, SOLUTION INTRAVENOUS at 10:45

## 2024-07-01 RX ADMIN — MAGNESIUM SULFATE HEPTAHYDRATE 4 G: 40 INJECTION, SOLUTION INTRAVENOUS at 12:40

## 2024-07-01 RX ADMIN — NITROGLYCERIN 0.4 MG: 0.4 TABLET SUBLINGUAL at 07:40

## 2024-07-01 RX ADMIN — NITROGLYCERIN 0.4 MG: 0.4 TABLET SUBLINGUAL at 00:25

## 2024-07-01 RX ADMIN — ENOXAPARIN SODIUM 40 MG: 40 INJECTION SUBCUTANEOUS at 10:41

## 2024-07-01 RX ADMIN — MORPHINE SULFATE 4 MG: 4 INJECTION INTRAVENOUS at 08:46

## 2024-07-01 RX ADMIN — TICAGRELOR 90 MG: 90 TABLET ORAL at 08:46

## 2024-07-01 RX ADMIN — METOPROLOL TARTRATE 25 MG: 25 TABLET, FILM COATED ORAL at 08:46

## 2024-07-01 RX ADMIN — POLYETHYLENE GLYCOL 3350 17 G: 17 POWDER, FOR SOLUTION ORAL at 10:41

## 2024-07-01 RX ADMIN — ATORVASTATIN CALCIUM 80 MG: 80 TABLET, FILM COATED ORAL at 08:46

## 2024-07-01 RX ADMIN — DOCUSATE SODIUM 100 MG: 100 CAPSULE, LIQUID FILLED ORAL at 17:16

## 2024-07-01 RX ADMIN — COLCHICINE 0.6 MG: 0.6 TABLET ORAL at 12:09

## 2024-07-01 RX ADMIN — NITROGLYCERIN 0.4 MG: 0.4 TABLET SUBLINGUAL at 04:25

## 2024-07-01 RX ADMIN — ACETAMINOPHEN 975 MG: 325 TABLET, FILM COATED ORAL at 08:46

## 2024-07-01 RX ADMIN — POTASSIUM CHLORIDE 40 MEQ: 1500 TABLET, EXTENDED RELEASE ORAL at 12:09

## 2024-07-01 RX ADMIN — DOCUSATE SODIUM 100 MG: 100 CAPSULE, LIQUID FILLED ORAL at 10:40

## 2024-07-01 RX ADMIN — CALCIUM GLUCONATE 2 G: 20 INJECTION, SOLUTION INTRAVENOUS at 10:36

## 2024-07-01 RX ADMIN — ASPIRIN 81 MG: 81 TABLET, COATED ORAL at 08:46

## 2024-07-01 NOTE — CASE MANAGEMENT
Case Management Assessment & Discharge Planning Note    Patient name Nolan Escobar  Location ICU ICU  MRN [id number]  : 1960 Date 2024       Current Admission Date: 2024  Current Admission Diagnosis:Acute ST elevation myocardial infarction (STEMI) involving left anterior descending (LAD) coronary artery (HCC)   Patient Active Problem List    Diagnosis Date Noted Date Diagnosed    Dyslipidemia 2024     Hypokalemia 2024     Hypertension 2024     Acute ST elevation myocardial infarction (STEMI) involving left anterior descending (LAD) coronary artery (HCC) 2024       LOS (days): 1  Geometric Mean LOS (GMLOS) (days): 2  Days to GMLOS:1.1     OBJECTIVE:    Risk of Unplanned Readmission Score: 12.91         Current admission status: Inpatient       Preferred Pharmacy:   [id number] [place]  [address] [facility]  [address] [place] [address]  Phone: [phone] Fax: [phone]    Primary Care Provider: No primary care provider on file.    Primary Insurance: RACHEL CHAVEZ PENDING  Secondary Insurance:     ASSESSMENT:  Active Health Care Proxies       Heidy Beltran Health Care Representative - [facility]   Primary Phone: [phone] (Mobile)                 Advance Directives  Does patient have a Health Care POA?: No  Was patient offered paperwork?: Yes (declined)  Does patient currently have a Health Care decision maker?: Yes, please see Health Care Proxy section  Does patient have Advance Directives?: No  Was patient offered paperwork?: Yes (declined)  Primary Contact: Heidy Beltran (niece) [phone]       Readmission Root Cause  30 Day Readmission: No    Patient Information  Admitted from:: Home  Mental Status: Alert  During Assessment patient was accompanied by: Other-Comment (niece)  Assessment information provided by:: Patient (niece)  Primary Caregiver: Self  Support Systems: Self, Son, Family members  County of Residence:  [place]  What city do you live in[place]  Home entry access options. Select all that apply.: No steps to enter home, Stairs  Number of steps to enter home.: 3  Do the steps have railings?: Yes  Type of Current Residence: 2 story home  Upon entering residence, is there a bedroom on the main floor (no further steps)?: Yes  Upon entering residence, is there a bathroom on the main floor (no further steps)?: Yes  Living Arrangements: Lives w/ Extended Family (lives with niece Heidy)  Is patient a ?: No    Activities of Daily Living Prior to Admission  Functional Status: Independent  Completes ADLs independently?: Yes  Ambulates independently?: Yes  Does patient use assisted devices?: No  Does patient currently own DME?: No  Does patient have a history of Outpatient Therapy (PT/OT)?: No  Does the patient have a history of Short-Term Rehab?: No  Does patient have a history of HHC?: No  Does patient currently have HHC?: No     Patient Information Continued  Income Source: Employed  Does patient have prescription coverage?: No (MA pending)  Does patient receive dialysis treatments?: No  Does patient have a history of substance abuse?: No  Does patient have a history of Mental Health Diagnosis?: No    PHQ 2/9 Screening   Reviewed PHQ 2/9 Depression Screening Score?: No    Means of Transportation  Means of Transport to Appts:: Family transport      Social Determinants of Health (SDOH)      Flowsheet Row Most Recent Value   Housing Stability    In the last 12 months, was there a time when you were not able to pay the mortgage or rent on time? N   In the past 12 months, how many times have you moved where you were living? 0   At any time in the past 12 months, were you homeless or living in a shelter (including now)? N   Transportation Needs    In the past 12 months, has lack of transportation kept you from medical appointments or from getting medications? no   In the past 12 months, has lack of transportation  kept you from meetings, work, or from getting things needed for daily living? No   Food Insecurity    Within the past 12 months, you worried that your food would run out before you got the money to buy more. Never true   Within the past 12 months, the food you bought just didn't last and you didn't have money to get more. Never true   Utilities    In the past 12 months has the electric, gas, oil, or water company threatened to shut off services in your home? No          DISCHARGE DETAILS:    Discharge planning discussed with:: patient and niece  Freedom of Choice: Yes     CM contacted family/caregiver?: No- see comments (niece at the bedside)  Were Treatment Team discharge recommendations reviewed with patient/caregiver?: Yes  Did patient/caregiver verbalize understanding of patient care needs?: Yes  Were patient/caregiver advised of the risks associated with not following Treatment Team discharge recommendations?: Yes    Contacts  Patient Contacts: Heidy Beltran (niece) [phone]  Relationship to Patient:: Family  Contact Method: Phone  Phone Number: [phone]  Reason/Outcome: Emergency Contact, Discharge Planning    Requested Home Health Care         Is the patient interested in HHC at discharge?: No    DME Referral Provided  Referral made for DME?: No    Other Referral/Resources/Interventions Provided:  Financial Resources Provided: Financial Counselor       Treatment Team Recommendation: Home  Discharge Destination Plan:: Home     Additional Comments: CM met with patient and family at the bedside and introduced herself and reviewed CM role. patient had concern about his lack of insurance coverage and states he is worried about not being able to pay his hospital bill. CM discussed the Financial 's role in providing assistance and sent an email to the department for assistance. CM will also assist the patient with finding a PCP since he does not currently have one. CM discussed the high cost of  Brilinta with the cardiologist. The cardiologist will look int prescribing a cheaper alternative upon discharge. CM department will continue to follow the patient through hospital discharge.

## 2024-07-01 NOTE — PROGRESS NOTES
[facility]  Progress Note  Name: Nolan Escobar I  MRN: [id number]  Unit/Bed#: ICU 01 I Date of Admission: 6/30/2024   Date of Service: 7/1/2024 I Hospital Day: 1    Assessment & Plan   * Acute ST elevation myocardial infarction (STEMI) involving left anterior descending (LAD) coronary artery (HCC)  Assessment & Plan  Patient with no pertinent PMH presented with acute-onset mid-sternal chest pressure after syncopal episode  Initial EKG - ST elevation in the anterior leads  STEMI alert called. Underwent emergent cardiac catheterization, which revealed 100% proximal LAD occlusion. PTCA was performed and drug-eluting stent deployed with flow restored.  Arrived to ICU on nitroglycerin gtt which was discontinued as patient was mildly hypotensive  Lipid panel - elevated LDL    Plan  Cardiology on board, appreciate recommedendations  Continue aspirin, Brillinta, atorvastatin, Lopressor  Continuous cardiopulmonary monitoring  TR band on right wrist with adequate hemostasis  Follow Echo             Disposition: Critical care    ICU Core Measures     A: Assess, Prevent, and Manage Pain Has pain been assessed? Yes  Need for changes to pain regimen? No   B: Both SAT/SAT  N/A   C: Choice of Sedation RASS Goal: N/A patient not on sedation  Need for changes to sedation or analgesia regimen? NA   D: Delirium CAM-ICU: Negative   E: Early Mobility  Plan for early mobility? Yes   F: Family Engagement Plan for family engagement today? Yes         Prophylaxis:  VTE SCDs for now   Stress Ulcer  not ordered         Significant 24hr Events     24hr events: Patient was seen and examined at bedside. TRData  [id number] facilitated conversation. He continues to complain of left lower chest wall pain similar to the pain that he presented with. Also reports associated shortness of breath. Not improved by nitroglycerin overnight. No other complaints at this time.      Subjective   Review of Systems:  See HPI for Review of Systems     Objective                            Vitals I/O      Most Recent Min/Max in 24hrs   Temp 98.7 °F (37.1 °C) Temp  Min: 97.6 °F (36.4 °C)  Max: 98.7 °F (37.1 °C)   Pulse 86 Pulse  Min: 60  Max: 86   Resp (!) 30 Resp  Min: 11  Max: 37   BP (!) 156/113 BP  Min: 118/82  Max: 180/110   O2 Sat 96 % SpO2  Min: 93 %  Max: 98 %      Intake/Output Summary (Last 24 hours) at 7/1/2024 0842  Last data filed at 7/1/2024 0757  Gross per 24 hour   Intake 3.87 ml   Output 1175 ml   Net -1171.13 ml       Diet Cardiovascular; Cardiac    Invasive Monitoring           Physical Exam   Physical Exam  Eyes:      Extraocular Movements: Extraocular movements intact.      Pupils: Pupils are equal, round, and reactive to light.   HENT:      Head: Normocephalic and atraumatic.   Cardiovascular:      Rate and Rhythm: Normal rate and regular rhythm.   Musculoskeletal:         General: Normal range of motion.   Constitutional:       General: He is not in acute distress.     Appearance: He is well-developed and well-nourished. He is not ill-appearing.   Pulmonary:      Effort: Tachypnea present. No accessory muscle usage, respiratory distress or accessory muscle usage.      Breath sounds: No wheezing.   Chest:      Chest wall: Tenderness (left lower chest wall/costal margin) present.   Neurological:      Mental Status: He is alert and oriented to person, place and time. Mental status is at baseline.      Motor: Strength full and intact in all extremities.            Diagnostic Studies      EKG: NSR. ST elevations in leads I, aVL, V2-V4  Imaging:  I have personally reviewed pertinent films in PACS     Medications:  Scheduled PRN   aspirin, 81 mg, Daily  atorvastatin, 80 mg, Daily  chlorhexidine, 15 mL, Q12H MATTEO  lidocaine, 1 patch, Daily  metoprolol tartrate, 25 mg, Q12H MATTEO  morphine injection, 4 mg, Once  potassium chloride, 20 mEq, Once   Followed by  potassium chloride, 20 mEq, Once  ticagrelor, 90 mg, Q12H  MATTEO      acetaminophen, 975 mg, Q6H PRN  aluminum-magnesium hydroxide-simethicone, 30 mL, Q6H PRN  labetalol, 10 mg, Q6H PRN  nitroglycerin, 0.4 mg, Q5 Min PRN  ondansetron, 4 mg, Q6H PRN  sodium chloride (PF), 3 mL, Q1H PRN       Continuous          Labs:    CBC    Recent Labs     06/30/24  1446 07/01/24  0437   WBC 7.89 15.12*   HGB 16.0 16.4   HCT 46.9 48.4    356     BMP    Recent Labs     06/30/24  1446 07/01/24  0437   SODIUM 140 139   K 3.2* 3.0*    114*   CO2 28 18*   AGAP 8 7   BUN 10 11   CREATININE 1.12 0.62   CALCIUM 9.8 6.4*       Coags    Recent Labs     06/30/24  1446   INR 1.03   PTT 30        Additional Electrolytes  Recent Labs     06/30/24  1446   MG 2.0          Blood Gas    No recent results  No recent results LFTs  No recent results    Infectious  No recent results  Glucose  Recent Labs     06/30/24  1446 07/01/24  0437   GLUC 132 129               Jennifer Sood MD

## 2024-07-01 NOTE — UTILIZATION REVIEW
Initial Clinical Review    Admission: Date/Time/Statement:   Admission Orders (From admission, onward)       Ordered        06/30/24 1730  Inpatient Admission  Once                          Orders Placed This Encounter   Procedures    Inpatient Admission     Standing Status:   Standing     Number of Occurrences:   1     Order Specific Question:   Level of Care     Answer:   Critical Care [15]     Order Specific Question:   Estimated length of stay     Answer:   More than 2 Midnights     Order Specific Question:   Certification     Answer:   I certify that inpatient services are medically necessary for this patient for a duration of greater than two midnights. See H&P and MD Progress Notes for additional information about the patient's course of treatment.     ED Arrival Information       Expected   -    Arrival   6/30/2024 14:35    Acuity   Immediate              Means of arrival   Ambulance    Escorted by   [facility] ([facility])    Service   Hospitalist    Admission type   Emergency              Arrival complaint   chest pain             Chief Complaint   Patient presents with    STEMI Alert     Pt had a unwitnessed syncope episode when he woke up he had cp and was sweating         Initial Presentation: 64 y.o. male to ED by EMS due to sudden onset of substernal chest pressure after a syncopal event;     EXAM  EKG  ST elevations in his anterior leads with reciprocal ST depression. Complain of chest discomfort. Given aspirin, high intensity statin, heparin and Brilinta   A MI alert was called and the patient was taken to the cardiac catheterization lab where he was found to have a 100% proximal occlusion of the left anterior descending artery. A drug-eluting stent was deployed  transferred to the intensive care unit post cath, PTCA was performed and drug-eluting stent deployed with flow restored  .   Inpatient admission due to acute ST Segment MI anterior wall  Continuous cardiopulmonary monitoring; IV  NTG GTT, consult Cardiology; echo    Date: 7/1   Day 2:   Cardiology  Significant improvement in his chest discomfort, but it did somewhat recur overnight. 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  Initiate colchicine 0.6 mg twice daily  ECGs and case reviewed with interventional and recommend continued medical therapy, especially given improved ECGs  Start losartan 25 mg daily  Add amlodipine 5 mg daily for antianginal effect and blood pressure control if patient still hypertensive later today  Preliminary, left ventricular systolic function is significantly down at bedside; formal echo pending  Will switch from Lopressor to Toprol-XL 25 mg twice daily  Would ultimately like to start on Jardiance and transition from losartan to Entresto if insurance is able to tolerate  Lifelong aspirin and minimum of 1 year of Brilinta for dual antiplatelet therapy  Continue high intensity statin  Further recommendations to follow completed testing  IF CP free later in PM transfer to [facility] tele    ED Triage Vitals   Temperature Pulse Respirations Blood Pressure SpO2 Pain Score   06/30/24 1442 06/30/24 1440 06/30/24 1440 06/30/24 1448 06/30/24 1440 06/30/24 1458   98.4 °F (36.9 °C) 63 (!) 24 (!) 155/109 98 % 10 - Worst Possible Pain     Weight (last 2 days)       Date/Time Weight    07/01/24 0843 70.3 (155)    06/30/24 1731 70.7 (155.87)    06/30/24 1639 70.7 (155.87)    06/30/24 14:51:21 74.8 (164.9)            Vital Signs (last 3 days)       Date/Time Temp Pulse Resp BP MAP (mmHg) SpO2 Calculated FIO2 (%) - Nasal Cannula Nasal Cannula O2 Flow Rate (L/min) O2 Device Pain    07/01/24 1209 -- -- -- -- -- -- -- -- -- No Pain    07/01/24 1200 -- 88 23 144/103 120 96 % -- -- -- --    07/01/24 1155 -- 88 24 142/99 112 96 % -- -- -- --    07/01/24 0846 -- -- -- -- -- -- -- -- -- 8    07/01/24 0843  -- 82 32 145/107 -- 95 % -- -- -- --    07/01/24 0600 -- 86 30 156/113 130 96 % -- -- -- --    07/01/24 0530 -- 84 29 148/77 100 96 % -- -- -- --    07/01/24 0500 -- 84 37 146/104 118 93 % -- -- -- --    07/01/24 0430 -- 86 15 148/94 123 94 % -- -- -- --    07/01/24 0330 -- 82 26 159/106 125 95 % -- -- -- --    07/01/24 0300 -- 84 19 150/105 121 96 % -- -- -- --    07/01/24 0230 -- 76 20 141/90 112 95 % -- -- -- --    07/01/24 0200 -- 80 25 146/99 116 95 % -- -- -- --    07/01/24 0130 -- 78 25 133/86 102 94 % -- -- -- --    07/01/24 0100 -- 76 26 123/81 105 93 % -- -- -- --    07/01/24 0030 -- 76 24 159/105 126 96 % -- -- -- --    07/01/24 0015 -- 78 23 -- -- 96 % -- -- -- --    07/01/24 0000 98.7 °F (37.1 °C) 76 15 167/111 131 96 % -- -- -- --    06/30/24 2330 -- 74 21 157/111 131 96 % -- -- -- --    06/30/24 2300 -- 76 23 178/120 142 95 % -- -- -- --    06/30/24 2240 -- 82 25 -- -- 97 % -- -- -- --    06/30/24 2204 -- 80 20 -- -- 97 % -- -- -- --    06/30/24 2200 -- 78 23 176/111 133 97 % -- -- -- --    06/30/24 2100 -- 76 15 176/116 137 97 % -- -- -- --    06/30/24 2000 -- 72 23 165/101 122 97 % -- -- -- --    06/30/24 1952 -- -- -- -- -- -- -- -- -- No Pain    06/30/24 1900 98.6 °F (37 °C) 68 24 118/82 103 96 % -- -- None (Room air) --    06/30/24 1830 -- 62 19 137/92 109 97 % -- -- -- No Pain    06/30/24 1800 -- 60 21 160/93 113 97 % -- -- -- --    06/30/24 1745 -- 64 19 154/104 123 97 % -- -- -- No Pain    06/30/24 1731 -- 66 21 -- -- 97 % -- -- -- --    06/30/24 1730 -- 64 21 156/102 126 97 % -- -- None (Room air) No Pain    06/30/24 1715 -- 64 18 162/112 137 98 % -- -- -- --    06/30/24 1700 -- 66 11 149/108 126 98 % -- -- None (Room air) No Pain    06/30/24 1645 -- 66 17 144/97 115 96 % -- -- -- --    06/30/24 1639 -- 68 18 144/97 115 95 % -- -- -- --    06/30/24 1630 97.6 °F (36.4 °C) 68 20 119/85 -- -- -- -- None (Room air) No Pain    06/30/24 15:25:23 -- -- -- -- -- 94 % 36 4 L/min Nasal cannula --     06/30/24 15:06:23 -- -- -- -- -- -- -- -- None (Room air) --    06/30/24 15:00:07 -- 62 20 180/110 -- 97 % -- -- -- --    06/30/24 1458 -- -- -- -- -- -- -- -- -- 10 - Worst Possible Pain    06/30/24 14:48:08 -- -- -- 155/109 -- -- -- -- -- --    06/30/24 14:42:28 98.4 °F (36.9 °C) -- -- -- -- -- -- -- -- --    06/30/24 14:40:48 -- 63 24 -- -- 98 % -- -- -- --              Pertinent Labs/Diagnostic Test Results:   Radiology:  XR chest 1 view portable   Final Interpretation by Jose Allred MD (07/01 0949)      No acute cardiopulmonary disease.            Workstation performed: [id number]           Cardiology:  Echo complete w/ contrast if indicated   Final Result by Arnaldo Carlin DO (07/01 1365)        Left Ventricle: Left ventricular cavity size is normal. Wall thickness    is mildly increased. The left ventricular ejection fraction is 31% by    biplane measurement. Systolic function is moderately reduced. Global    longitudinal strain is reduced at -6%. Diastolic function is mildly    abnormal, consistent with grade I (abnormal) relaxation.     The following segments are dyskinetic: apex.     The following segments are akinetic: mid anteroseptal, apical anterior,    apical septal, apical inferior and apical lateral.     The following segments are hypokinetic: basal anteroseptal, mid    anterior, mid inferior and mid inferolateral.     All other segments are normal.     Mitral Valve: There is mild regurgitation.     Tricuspid Valve: There is mild regurgitation.      There is severe mid to apical hypokinesis/akinesis with dyskinesis of the    true apex itself.  This is consistent with large LAD territory infarct.         ECG 12 lead   Final Result by Michael Baugh DO (07/01 1112)   Normal sinus rhythm   Septal infarct (cited on or before 30-JUN-2024)   Lateral infarct (cited on or before 30-JUN-2024)   Anterior injury pattern   Abnormal ECG   When compared with ECG of 30-JUN-2024 16:28,   T wave amplitude  has decreased in Anterior leads   Confirmed by Michael Baugh ([id number]) on 7/1/2024 11:12:45 AM      ECG 12 lead   Final Result by Cisco Nassar MD (06/30 1949)   Normal sinus rhythm   Septal infarct , possibly acute   Lateral infarct , possibly acute   Anterior injury pattern   * ** ** * ACUTE MI  ** ** ** **   Abnormal ECG   When compared with ECG of 30-JUN-2024 14:44,   Septal infarct is now Present   Lateral infarct is now Present   ST no longer depressed in Inferior leads   ST more elevated in Lateral leads   Confirmed by Cisco Nassar ([id number]) on 6/30/2024 7:49:27 PM      Cardiac catheterization   Final Result by Jovany Wise MD (06/30 1618)        Prox LAD lesion is 100% stenosed.     The angioplasty was pre-stent angioplasty.      Single-vessel coronary artery disease.  There is a 100% stenosis of the    proximal LAD, representing the culprit for the patient's anterior STEMI.     Other vessels were free of apparent atherosclerosis.   Successful IVUS-guided PCI, proximal LAD, with reduction in stenosis from    100% to 0% and improvement in ERIBERTO flow from 0-3 following placement of a    3.0x26mm drug-eluding stent and post-dilation of the proximal and mid    stent with a 3.25mm balloon.   Markedly elevated LVEDP (35 mmHg).           ECG 12 lead   Final Result by Cisco Nassar MD (06/30 1950)   Normal sinus rhythm   ST elevation consider anterolateral injury or acute infarct   ** ** ACUTE MI / STEMI ** **   Abnormal ECG   No previous ECGs available   Confirmed by Cisco Nassar ([id number]) on 6/30/2024 7:50:11 PM        GI:  No orders to display           Results from last 7 days   Lab Units 07/01/24  0437 06/30/24  1446   WBC Thousand/uL 15.12* 7.89   HEMOGLOBIN g/dL 16.4 16.0   HEMATOCRIT % 48.4 46.9   PLATELETS Thousands/uL 356 329   TOTAL NEUT ABS Thousands/µL 13.07* 4.15         Results from last 7 days   Lab Units 07/01/24  1117 07/01/24  0437 06/30/24  1446   SODIUM mmol/L  --  139 140   POTASSIUM mmol/L  --   "3.0* 3.2*   CHLORIDE mmol/L  --  114* 104   CO2 mmol/L  --  18* 28   ANION GAP mmol/L  --  7 8   BUN mg/dL  --  11 10   CREATININE mg/dL  --  0.62 1.12   EGFR ml/min/1.73sq m  --  104 69   CALCIUM mg/dL  --  6.4* 9.8   CALCIUM, IONIZED mmol/L 1.21  --   --    MAGNESIUM mg/dL  --  1.3* 2.0   PHOSPHORUS mg/dL  --  2.1*  --              Results from last 7 days   Lab Units 07/01/24  0437 06/30/24  1446   GLUCOSE RANDOM mg/dL 129 132             No results found for: \"BETA-HYDROXYBUTYRATE\"                   Results from last 7 days   Lab Units 06/30/24  1641 06/30/24  1446   HS TNI 0HR ng/L  --  111*   HS TNI 2HR ng/L >22,973*  --    HSTNI D2 ng/L >22,862*  --          Results from last 7 days   Lab Units 06/30/24  1446   PROTIME seconds 13.7   INR  1.03   PTT seconds 30           ED Treatment-Medication Administration from 06/30/2024 1435 to 06/30/2024 1629         Date/Time Order Dose Route Action     06/30/2024 1447 sodium chloride 0.9 % infusion 75 mL/hr Intravenous New Bag     06/30/2024 1444 ticagrelor (BRILINTA) tablet 180 mg 180 mg Oral Given     06/30/2024 1443 heparin (porcine) injection 4,000 Units 4,000 Units Intravenous Given     06/30/2024 1458 morphine injection 4 mg 4 mg Intravenous Given     06/30/2024 1610 nitroGLYcerin (TRIDIL) 50 mg in 250 ml infusion (premix) 30 mcg/min Intravenous New Bag            No past medical history on file.  Present on Admission:   Acute ST elevation myocardial infarction (STEMI) involving left anterior descending (LAD) coronary artery ([facility])      Admitting Diagnosis: Chest pain [R07.9]  STEMI (ST elevation myocardial infarction) ([facility]) [I21.3]  Age/Sex: 64 y.o. male  Admission Orders:  Tele  ECHO  Cardiac cath  I/O  Daily WT      Scheduled Medications:  amLODIPine, 5 mg, Oral, Daily  aspirin, 81 mg, Oral, Daily  atorvastatin, 80 mg, Oral, Daily  chlorhexidine, 15 mL, Mouth/Throat, Q12H [facility]  colchicine, 0.6 mg, Oral, BID  docusate sodium, 100 mg, Oral, BID  enoxaparin, 40 mg, " Subcutaneous, Q24H MATTEO  lidocaine, 1 patch, Topical, Daily  losartan, 25 mg, Oral, Daily  magnesium sulfate, 4 g, Intravenous, Once  metoprolol succinate, 25 mg, Oral, BID  polyethylene glycol, 17 g, Oral, Daily  ticagrelor, 90 mg, Oral, Q12H [facility]TEO      Continuous IV Infusions:           Medications 06/30 07/01   nitroGLYcerin (TRIDIL) 50 mg in 250 ml infusion (premix)  Freq: Code/trauma/sedation continuous med Route: IV  Last Dose: Stopped (06/30/24 1630)  Start: 06/30/24 1610 End: 06/30/24 1616    1610     1630         sodium chloride 0.9 % infusion  Rate: 50 mL/hr Dose: 50 mL/hr  Freq: Continuous Route: IV  Indications of Use: IV Hydration  Last Dose: Stopped (07/01/24 1028)  Start: 06/30/24 1745 End: 06/30/24 2329    1730     2329-D/C'd    1028        sodium chloride 0.9 % infusion  Rate: 50 mL/hr Dose: 50 mL/hr  Freq: Continuous Route: IV  Indications of Use: IV Hydration  Start: 06/30/24 1745 End: 06/30/24 1732         1732-D/C'd       sodium chloride 0.9 % infusion  Rate: 75 mL/hr Dose: 75 mL/hr  Freq: Continuous Route: IV  Indications of Use: IV Hydration  Last Dose: Stopped (06/30/24 1730)  Start: 06/30/24 1445 End: 06/30/24 1730    1447     1730     1730-D/C'd           PRN Meds:  acetaminophen, 975 mg, Oral, Q6H PRN  aluminum-magnesium hydroxide-simethicone, 30 mL, Oral, Q6H PRN  labetalol, 10 mg, Intravenous, Q6H PRN  nitroglycerin, 0.4 mg, Sublingual, Q5 Min PRN  ondansetron, 4 mg, Intravenous, Q6H PRN  sodium chloride (PF), 3 mL, Intravenous, Q1H PRN        IP CONSULT TO CARDIOLOGY  IP CONSULT TO CASE MANAGEMENT    Network Utilization Review Department  ATTENTION: Please call with any questions or concerns to [phone] and carefully listen to the prompts so that you are directed to the right person. All voicemails are confidential.   For Discharge needs, contact Care Management DC Support Team at [phone] opt. 2  Send all requests for admission clinical reviews, approved or denied determinations  and any other requests to dedicated fax number below belonging to the campus where the patient is receiving treatment. List of dedicated fax numbers for the Facilities:  FACILITY NAME UR FAX NUMBER   ADMISSION DENIALS (Administrative/Medical Necessity) [phone]   DISCHARGE SUPPORT TEAM (NETWORK) [phone]   PARENT CHILD HEALTH (Maternity/NICU/Pediatrics) [phone]   [facility] [phone]   [facility] [phone]   [facility] [phone]   [facility] [phone]   [facility] [phone]   [facility] [phone]   [facility] [phone]   [facility] [phone]   [facility] [phone]   [facility] [phone]   [facility] [phone]   [facility] [phone]

## 2024-07-01 NOTE — PLAN OF CARE
Problem: PAIN - ADULT  Goal: Verbalizes/displays adequate comfort level or baseline comfort level  Description: Interventions:  - Encourage patient to monitor pain and request assistance  - Assess pain using appropriate pain scale  - Administer analgesics based on type and severity of pain and evaluate response  - Implement non-pharmacological measures as appropriate and evaluate response  - Consider cultural and social influences on pain and pain management  - Notify physician/advanced practitioner if interventions unsuccessful or patient reports new pain  Outcome: Progressing     Problem: INFECTION - ADULT  Goal: Absence or prevention of progression during hospitalization  Description: INTERVENTIONS:  - Assess and monitor for signs and symptoms of infection  - Monitor lab/diagnostic results  - Monitor all insertion sites, i.e. indwelling lines, tubes, and drains  - Monitor endotracheal if appropriate and nasal secretions for changes in amount and color  - [place] appropriate cooling/warming therapies per order  - Administer medications as ordered  - Instruct and encourage patient and family to use good hand hygiene technique  - Identify and instruct in appropriate isolation precautions for identified infection/condition  Outcome: Progressing  Goal: Absence of fever/infection during neutropenic period  Description: INTERVENTIONS:  - Monitor WBC    Outcome: Progressing     Problem: SAFETY ADULT  Goal: Patient will remain free of falls  Description: INTERVENTIONS:  - Educate patient/family on patient safety including physical limitations  - Instruct patient to call for assistance with activity   - Consult OT/PT to assist with strengthening/mobility   - Keep Call bell within reach  - Keep bed low and locked with side rails adjusted as appropriate  - Keep care items and personal belongings within reach  - Initiate and maintain comfort rounds  - Make Fall Risk Sign visible to staff  - Offer Toileting every 2 Hours,  in advance of need  - Initiate/Maintain bed alarm  - Obtain necessary fall risk management equipment:   - Apply yellow socks and bracelet for high fall risk patients  - Consider moving patient to room near nurses station  Outcome: Progressing  Goal: Maintain or return to baseline ADL function  Description: INTERVENTIONS:  -  Assess patient's ability to carry out ADLs; assess patient's baseline for ADL function and identify physical deficits which impact ability to perform ADLs (bathing, care of mouth/teeth, toileting, grooming, dressing, etc.)  - Assess/evaluate cause of self-care deficits   - Assess range of motion  - Assess patient's mobility; develop plan if impaired  - Assess patient's need for assistive devices and provide as appropriate  - Encourage maximum independence but intervene and supervise when necessary  - Involve family in performance of ADLs  - Assess for home care needs following discharge   - Consider OT consult to assist with ADL evaluation and planning for discharge  - Provide patient education as appropriate  Outcome: Progressing  Goal: Maintains/Returns to pre admission functional level  Description: INTERVENTIONS:  - Perform AM-PAC 6 Click Basic Mobility/ Daily Activity assessment daily.  - Set and communicate daily mobility goal to care team and patient/family/caregiver.   - Collaborate with rehabilitation services on mobility goals if consulted  - Perform Range of Motion 4 times a day.  - Reposition patient every 2 hours.  - Dangle patient 3 times a day  - Stand patient 3 times a day  - Ambulate patient 3 times a day  - Out of bed to chair 3 times a day   - Out of bed for meals 3 times a day  - Out of bed for toileting  - Record patient progress and toleration of activity level   Outcome: Progressing     Problem: DISCHARGE PLANNING  Goal: Discharge to home or other facility with appropriate resources  Description: INTERVENTIONS:  - Identify barriers to discharge w/patient and caregiver  -  Arrange for needed discharge resources and transportation as appropriate  - Identify discharge learning needs (meds, wound care, etc.)  - Arrange for interpretive services to assist at discharge as needed  - Refer to Case Management Department for coordinating discharge planning if the patient needs post-hospital services based on physician/advanced practitioner order or complex needs related to functional status, cognitive ability, or social support system  Outcome: Progressing     Problem: Knowledge Deficit  Goal: Patient/family/caregiver demonstrates understanding of disease process, treatment plan, medications, and discharge instructions  Description: Complete learning assessment and assess knowledge base.  Interventions:  - Provide teaching at level of understanding  - Provide teaching via preferred learning methods  Outcome: Progressing     Problem: Prexisting or High Potential for Compromised Skin Integrity  Goal: Skin integrity is maintained or improved  Description: INTERVENTIONS:  - Identify patients at risk for skin breakdown  - Assess and monitor skin integrity  - Assess and monitor nutrition and hydration status  - Monitor labs   - Assess for incontinence   - Turn and reposition patient  - Assist with mobility/ambulation  - Relieve pressure over bony prominences  - Avoid friction and shearing  - Provide appropriate hygiene as needed including keeping skin clean and dry  - Evaluate need for skin moisturizer/barrier cream  - Collaborate with interdisciplinary team   - Patient/family teaching  - Consider wound care consult   Outcome: Progressing

## 2024-07-01 NOTE — ED PROVIDER NOTES
History  Chief Complaint   Patient presents with    STEMI Alert     Pt had a unwitnessed syncope episode when he woke up he had cp and was sweating       Chin is a 64-year-old male with past medical history of hypertension an dyslipidemia who presents via EMS as a prehospital MI alert.  Patient is primarily Brazilian-speaking and unable to obtain history on presentation due to acute distress secondary to chest pain  Per EMS, wife called 911 approximately 30 minutes prior to arrival as patient was found unconscious in his home.  Upon EMS MS arrival on the scene, patient was awake and alert sitting up in a chair, complaining of crushing substernal chest pain.  Patient was also diaphoretic paretic.  EKG on route to the hospital concerning for acute MI.  Patient was given 2x sub-lingual nitro on route as well as 324 mg aspirin.         None       No past medical history on file.    Past Surgical History:   Procedure Laterality Date    CARDIAC CATHETERIZATION N/A 6/30/2024    Procedure: Cardiac pci;  Surgeon: Jovany Wise MD;  Location: [facility];  Service: Cardiology    CARDIAC CATHETERIZATION N/A 6/30/2024    Procedure: Cardiac Coronary Angiogram;  Surgeon: Jovany Wise MD;  Location: [facility];  Service: Cardiology    CARDIAC CATHETERIZATION N/A 6/30/2024    Procedure: Cardiac IVUS;  Surgeon: oJvany Wise MD;  Location: [facility];  Service: Cardiology    CARDIAC CATHETERIZATION Left 6/30/2024    Procedure: Cardiac Left Heart Cath;  Surgeon: Jovany Wise MD;  Location: [facility];  Service: Cardiology       No family history on file.  I have reviewed and agree with the history as documented.    E-Cigarette/Vaping     E-Cigarette/Vaping Substances           Review of Systems   Unable to perform ROS: Acuity of condition       Physical Exam  ED Triage Vitals   Temperature Pulse Respirations Blood Pressure SpO2   06/30/24 1442 06/30/24 1440 06/30/24 1440 06/30/24 1448 06/30/24 1440    98.4 °F (36.9 °C) 63 (!) 24 (!) 155/109 98 %      Temp Source Heart Rate Source Patient Position - Orthostatic VS BP Location FiO2 (%)   06/30/24 1442 06/30/24 1630 -- 06/30/24 1630 --   Oral Monitor  Left arm       Pain Score       06/30/24 1458       10 - Worst Possible Pain             Orthostatic Vital Signs  Vitals:    07/01/24 1200 07/01/24 1209 07/01/24 1230 07/01/24 1300   BP: (!) 144/103      Pulse: 88 86 88 82       Physical Exam  Constitutional:       General: He is in acute distress.      Appearance: He is normal weight. He is ill-appearing and diaphoretic.   HENT:      Head: Normocephalic and atraumatic.      Mouth/Throat:      Mouth: Mucous membranes are moist.   Eyes:      Extraocular Movements: Extraocular movements intact.      Conjunctiva/sclera: Conjunctivae normal.   Cardiovascular:      Rate and Rhythm: Normal rate and regular rhythm.      Pulses: Normal pulses.      Heart sounds: Normal heart sounds.      Comments: 2+ pulses of bilateral upper and lower extremities.  Pulmonary:      Effort: Pulmonary effort is normal.      Breath sounds: Normal breath sounds.   Abdominal:      General: Abdomen is flat. There is no distension.      Palpations: Abdomen is soft.      Tenderness: There is no abdominal tenderness.   Musculoskeletal:         General: No swelling. Normal range of motion.      Right lower leg: No edema.      Left lower leg: No edema.   Skin:     General: Skin is warm.      Capillary Refill: Capillary refill takes less than 2 seconds.   Neurological:      Mental Status: He is alert and oriented to person, place, and time.         ED Medications  Medications   sodium chloride (PF) 0.9 % injection 3 mL (has no administration in time range)   atorvastatin (LIPITOR) tablet 80 mg (80 mg Oral Given 7/1/24 0846)   aluminum-magnesium hydroxide-simethicone (MAALOX) oral suspension 30 mL (has no administration in time range)   ondansetron (ZOFRAN) injection 4 mg (has no administration in time  range)   nitroglycerin (NITROSTAT) SL tablet 0.4 mg (0.4 mg Sublingual Given 7/1/24 0740)   ticagrelor (BRILINTA) tablet 90 mg (90 mg Oral Given 7/1/24 0846)   chlorhexidine (PERIDEX) 0.12 % oral rinse 15 mL (15 mL Mouth/Throat Given 7/1/24 0847)   aspirin (ECOTRIN LOW STRENGTH) EC tablet 81 mg (81 mg Oral Given 7/1/24 0846)   labetalol (NORMODYNE) injection 10 mg (10 mg Intravenous Given 6/30/24 2317)   acetaminophen (TYLENOL) tablet 975 mg (975 mg Oral Given 7/1/24 0846)   lidocaine (LIDODERM) 5 % patch 1 patch (1 patch Topical Medication Applied 7/1/24 0847)   polyethylene glycol (MIRALAX) packet 17 g (17 g Oral Given 7/1/24 1041)   docusate sodium (COLACE) capsule 100 mg (100 mg Oral Given 7/1/24 1040)   enoxaparin (LOVENOX) subcutaneous injection 40 mg (40 mg Subcutaneous Given 7/1/24 1041)   metoprolol succinate (TOPROL-XL) 24 hr tablet 25 mg (25 mg Oral Not Given 7/1/24 1034)   amLODIPine (NORVASC) tablet 5 mg (has no administration in time range)   losartan (COZAAR) tablet 25 mg (25 mg Oral Given 7/1/24 1040)   colchicine (COLCRYS) tablet 0.6 mg (0.6 mg Oral Given 7/1/24 1209)   magnesium sulfate 4 g/100 mL IVPB (premix) 4 g (4 g Intravenous New Bag 7/1/24 1240)   ticagrelor (BRILINTA) tablet 180 mg (180 mg Oral Given 6/30/24 1444)   heparin (porcine) injection 4,000 Units (4,000 Units Intravenous Given 6/30/24 1443)   morphine injection (4 mg Intravenous Canceled Entry 6/30/24 1450)   morphine injection 4 mg (4 mg Intravenous Given 6/30/24 1458)   potassium chloride (Klor-Con M20) CR tablet 20 mEq (20 mEq Oral Given 6/30/24 1746)   nitroGLYcerin (TRIDIL) 50 mg in 250 ml infusion (premix) (0 mcg Intravenous Stopped 6/30/24 1630)   metoprolol (LOPRESSOR) injection 5 mg (5 mg Intravenous Given 6/30/24 1746)   potassium chloride 20 mEq IVPB (premix) (0 mEq Intravenous Stopped 7/1/24 1151)   morphine injection 4 mg (4 mg Intravenous Given 7/1/24 0846)   calcium gluconate 2 g in sodium chloride 0.9% 100 mL  (premix) (2 g Intravenous New Bag 7/1/24 1036)   potassium chloride (Klor-Con M20) CR tablet 40 mEq (40 mEq Oral Given 7/1/24 1040)   magnesium sulfate 2 g/50 mL IVPB (premix) 2 g (2 g Intravenous New Bag 7/1/24 1045)   potassium chloride (Klor-Con M20) CR tablet 40 mEq (40 mEq Oral Given 7/1/24 1209)       Diagnostic Studies  Results Reviewed       Procedure Component Value Units Date/Time    HS Troponin I 2hr [[id number]]  (Abnormal) Collected: 06/30/24 1641    Lab Status: Final result Specimen: Blood from Hand, Left Updated: 06/30/24 1743     hs TnI 2hr >22,973 ng/L      Delta 2hr hsTnI >22,862 ng/L     POCT activated clotting time [[id number]]  (Abnormal) Collected: 06/30/24 1604    Lab Status: Final result Specimen: Venous Updated: 06/30/24 1615     Activated Clotting Time, i-STAT 261 sec      Specimen Type VENOUS    POCT activated clotting time [[id number]]  (Abnormal) Collected: 06/30/24 1551    Lab Status: Final result Specimen: Venous Updated: 06/30/24 1615     Activated Clotting Time, i-STAT 229 sec      Specimen Type VENOUS    Protime-INR [[id number]]  (Normal) Collected: 06/30/24 1446    Lab Status: Final result Specimen: Blood from Arm, Right Updated: 06/30/24 1519     Protime 13.7 seconds      INR 1.03    APTT [[id number]]  (Normal) Collected: 06/30/24 1446    Lab Status: Final result Specimen: Blood from Arm, Right Updated: 06/30/24 1519     PTT 30 seconds     HS Troponin 0hr (reflex protocol) [[id number]]  (Abnormal) Collected: 06/30/24 1446    Lab Status: Final result Specimen: Blood from Arm, Right Updated: 06/30/24 1516     hs TnI 0hr 111 ng/L     Basic metabolic panel [[id number]]  (Abnormal) Collected: 06/30/24 1446    Lab Status: Final result Specimen: Blood from Arm, Right Updated: 06/30/24 1513     Sodium 140 mmol/L      Potassium 3.2 mmol/L      Chloride 104 mmol/L      CO2 28 mmol/L      ANION GAP 8 mmol/L      BUN 10 mg/dL      Creatinine 1.12 mg/dL      Glucose 132 mg/dL      Calcium 9.8 mg/dL       eGFR 69 ml/min/1.73sq m     Narrative:      National Kid[employer] guidelines for Chronic Kidney Disease (CKD):     Stage 1 with normal or high GFR (GFR > 90 mL/min/1.73 square meters)    Stage 2 Mild CKD (GFR = 60-89 mL/min/1.73 square meters)    Stage 3A Moderate CKD (GFR = 45-59 mL/min/1.73 square meters)    Stage 3B Moderate CKD (GFR = 30-44 mL/min/1.73 square meters)    Stage 4 Severe CKD (GFR = 15-29 mL/min/1.73 square meters)    Stage 5 End Stage CKD (GFR <15 mL/min/1.73 square meters)  Note: GFR calculation is accurate only with a steady state creatinine    Lipid panel [[id number]]  (Abnormal) Collected: 06/30/24 1446    Lab Status: Final result Specimen: Blood from Arm, Right Updated: 06/30/24 1513     Cholesterol 232 mg/dL      Triglycerides 124 mg/dL      HDL, Direct 60 mg/dL      LDL Calculated 147 mg/dL      Non-HDL-Chol (CHOL-HDL) 172 mg/dl     Magnesium [[id number]]  (Normal) Collected: 06/30/24 1446    Lab Status: Final result Specimen: Blood from Arm, Right Updated: 06/30/24 1513     Magnesium 2.0 mg/dL     CBC and differential [[id number]]  (Abnormal) Collected: 06/30/24 1446    Lab Status: Final result Specimen: Blood from Arm, Right Updated: 06/30/24 1453     WBC 7.89 Thousand/uL      RBC 4.84 Million/uL      Hemoglobin 16.0 g/dL      Hematocrit 46.9 %      MCV 97 fL      MCH 33.1 pg      MCHC 34.1 g/dL      RDW 11.0 %      MPV 9.9 fL      Platelets 329 Thousands/uL      nRBC 0 /100 WBCs      Segmented % 52 %      Immature Grans % 0 %      Lymphocytes % 38 %      Monocytes % 9 %      Eosinophils Relative 0 %      Basophils Relative 1 %      Absolute Neutrophils 4.15 Thousands/µL      Absolute Immature Grans 0.02 Thousand/uL      Absolute Lymphocytes 2.97 Thousands/µL      Absolute Monocytes 0.67 Thousand/µL      Eosinophils Absolute 0.03 Thousand/µL      Basophils Absolute 0.05 Thousands/µL                    XR chest 1 view portable   Final Result by Jose Allred MD (07/01 0949)       No acute cardiopulmonary disease.            Workstation performed: [id number]               Procedures  ECG 12 Lead Documentation Only    Date/Time: 7/1/2024 1:02 PM    Performed by: Oralia Galdamez MD  Authorized by: Oralia Galdamez MD    Indications / Diagnosis:  Pre-hospital STEMI alert  ECG reviewed by me, the ED Provider: yes    Patient location:  ED  Interpretation:     Interpretation: abnormal    Rate:     ECG rate:  65    ECG rate assessment: normal    Rhythm:     Rhythm: sinus rhythm    Ectopy:     Ectopy: none    QRS:     QRS axis:  Normal    QRS intervals:  Normal  Conduction:     Conduction: normal    ST segments:     ST segments:  Abnormal (anterolateral leads)    Depression:  II, III and aVF    Conscious Sedation Assessment      Flowsheet Row Classification Score   ASA Scale Assessment 2-Mild to moderate systemic disease, medically well controlled, with no functional limitation filed at 06/30/2024 1525   Mallampati Classification Class II: soft palate, uvula, fauces visible - No Difficulty filed at 06/30/2024 1525            ED Course                                       Medical Decision Making  Patient is a 64-year-old male who presents to hospital via EMS as prehospital MI alert    Vital signs stable, afebrile  Physical exam notable for patient in acute distress, diaphoresis    Differential diagnosis: Based on EKG, STEMI most likely    Plan: STEMI alert called upon patient arrival to the hospital, discussed with cardiology via telephone  Patient loaded with Brilinta and heparin.    AMI protocol followed   Hemodynamics monitored and patient placed on [place] while in the ED    Disposition: Patient accompanied to the Cath Lab by cardiology.  Wife present at bedside, explained plan.       Amount and/or Complexity of Data Reviewed  Labs: ordered.  Radiology: ordered.    Risk  Prescription drug management.  Decision regarding hospitalization.          Disposition  Final diagnoses:   STEMI (ST  elevation myocardial infarction) (HCC)     Time reflects when diagnosis was documented in both MDM as applicable and the Disposition within this note       Time User Action Codes Description Comment    6/30/2024  2:40 PM Que Wagner Add [I21.3] STEMI (ST elevation myocardial infarction) (HCC)           ED Disposition       ED Disposition   Send to Cath Lab    Condition   --    Date/Time   Sun Jun 30, 2024 2810    Comment   --             Follow-up Information    None         There are no discharge medications for this patient.    No discharge procedures on file.    PDMP Review       None             ED Provider  Attending physically available and evaluated Nolan Escobar. I managed the patient along with the ED Attending.    Electronically Signed by           Oralia Galdamez MD  07/01/24 1721

## 2024-07-01 NOTE — PROGRESS NOTES
Cardiology Progress Note   MD Onel Wagner MD, FACC  Michael Baugh DO, [facility]  MD Ester Laird DO, [facility]  Arnaldo Carlin DO, [facility]  ----------------------------------------------------------------  [id number] [place] [address]    Nolan Escobar 64 y.o. 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  Will initiate colchicine 0.6 mg twice daily  ECGs and case reviewed with interventional and recommend continued medical therapy, especially given improved ECGs  Start losartan 25 mg daily  Will add amlodipine 5 mg daily for antianginal effect and blood pressure control if patient still hypertensive later today  Preliminary, left ventricular systolic function is significantly down at bedside; formal echo pending  Will switch from Lopressor to Toprol-XL 25 mg twice daily  Would ultimately like to start on Jardiance and transition from losartan to Entresto if insurance is able to tolerate  Lifelong aspirin and minimum of 1 year of Brilinta for dual antiplatelet therapy  Continue high intensity statin  Further recommendations to follow completed testing  The patient remains chest pain-free into the afternoon, likely reasonable to downgrade to telemetry today    Signed: Michael Baugh DO, [facility]      History of Present Illness:  Patient seen and examined.  Admitted to some of the chest discomfort this morning.  Denies lower extremity swelling, orthopnea or  paroxysmal nocturnal dyspnea.  Chest discomfort does somewhat worsen with deep inspiration.      Review of Systems:  Review of Systems   Constitutional: Negative for decreased appetite, fever, weight gain and weight loss.   HENT:  Negative for congestion and sore throat.    Eyes:  Negative for visual disturbance.   Cardiovascular:  Positive for chest pain. Negative for dyspnea on exertion, leg swelling, near-syncope and palpitations.   Respiratory:  Negative for cough and shortness of breath.    Hematologic/Lymphatic: Negative for bleeding problem.   Skin:  Negative for rash.   Musculoskeletal:  Negative for myalgias and neck pain.   Gastrointestinal:  Negative for abdominal pain and nausea.   Neurological:  Negative for light-headedness and weakness.   Psychiatric/Behavioral:  Negative for depression.        No Known Allergies    No current facility-administered medications on file prior to encounter.     No current outpatient medications on file prior to encounter.        Current Facility-Administered Medications   Medication Dose Route Frequency Provider Last Rate    acetaminophen  975 mg Oral Q6H PRN DENNYS Hood      aluminum-magnesium hydroxide-simethicone  30 mL Oral Q6H PRN Jovany Wise MD      aspirin  81 mg Oral Daily DENNYS Molina      atorvastatin  80 mg Oral Daily Michael Baugh DO      calcium gluconate  2 g Intravenous Once DENNYS Hood      chlorhexidine  15 mL Mouth/Throat Q12H [facility] DENNYS Molina      docusate sodium  100 mg Oral BID Jennifer Sood MD      enoxaparin  40 mg Subcutaneous Q24H [facility] DENNYS Hood      labetalol  10 mg Intravenous Q6H PRN DENNYS Link      lidocaine  1 patch Topical Daily DENNYS Hood      lisinopril  10 mg Oral Daily DENNYS Hood      magnesium sulfate  2 g Intravenous Once DENNYS Hood      metoprolol tartrate  25 mg Oral Q12H [facility] DENNYS Molina      nitroglycerin  0.4 mg  "Sublingual Q5 Min PRN Jovany Wise MD      ondansetron  4 mg Intravenous Q6H PRN Jovany Wise MD      polyethylene glycol  17 g Oral Daily Jennifer Sood MD      potassium chloride  40 mEq Oral Once Toya RIVERA MahajanArely, CRNP      potassium chloride  40 mEq Oral Once Toyarivera Mahajanler, CRNP      potassium chloride  20 mEq Intravenous Once Jennifer Sood MD 20 mEq (07/01/24 0847)    sodium chloride (PF)  3 mL Intravenous Q1H PRN Oralia Galdamez MD      ticagrelor  90 mg Oral Q12H [facility] DENNYS Molina              Vitals:    07/01/24 0500 07/01/24 0530 07/01/24 0600 07/01/24 0843   BP: (!) 146/104 148/77 (!) 156/113 (!) 145/107   BP Location:    Left arm   Patient Position:    Supine   Cuff Size:    Standard   Pulse: 84 84 86 82   Resp: (!) 37 (!) 29 (!) 30 (!) 32   Temp:       TempSrc:       SpO2: 93% 96% 96% 95%   Weight:    70.3 kg (155 lb)   Height:    5' 10\" (1.778 m)     Body mass index is 22.24 kg/m².      Intake/Output Summary (Last 24 hours) at 7/1/2024 0948  Last data filed at 7/1/2024 0757  Gross per 24 hour   Intake 3.87 ml   Output 1175 ml   Net -1171.13 ml       Weight change:     PHYSICAL EXAMINATION:  Gen: Awake, Alert, NAD  Head/eyes: AT/NC, pupils equal and round, Anicteric  ENT: mmm  Neck: Supple, No elevated JVP, trachea midline  Resp: CTA bilaterally no w/r/r  CV: RRR +S1, S2, No m/r/g  Abd: Soft, NT/ND + BS  Ext: no LE edema bilaterally  Neuro: Follows commands, moves all extermities  Psych: Appropriate affect, normal mood, pleasant attitude, non-combative  Skin: warm; no rash, erythema or venous stasis changes on exposed skin    Lab Results:  Results from last 7 days   Lab Units 07/01/24  0437   WBC Thousand/uL 15.12*   HEMOGLOBIN g/dL 16.4   HEMATOCRIT % 48.4   PLATELETS Thousands/uL 356     Results from last 7 days   Lab Units 07/01/24  0437   POTASSIUM mmol/L 3.0*   CHLORIDE mmol/L 114*   CO2 mmol/L 18*   BUN mg/dL 11   CREATININE mg/dL 0.62   CALCIUM mg/dL 6.4*     No results found for: " "\"TROPONINT\"      Results from last 7 days   Lab Units 07/01/24  0437 06/30/24  1641 06/30/24  1446   HS TNI 0HR ng/L  --   --  111*   HS TNI 2HR ng/L  --  >22,973*  --    HS TNI RAND ng/L >22,973*  --   --      Results from last 7 days   Lab Units 06/30/24  1446   INR  1.03       Tele: SR w/ NSVT 3 beat and 6 beat run    This note was completed in part utilizing M-Modal Fluency Direct Software.  Grammatical errors, random word insertions, spelling mistakes, and incomplete sentences may be an occasional consequence of this system secondary to software limitations, ambient noise, and hardware issues.  If you have any questions or concerns about the content, text, or information contained within the body of this dictation, please contact the provider for clarification.      "

## 2024-07-01 NOTE — PLAN OF CARE
Problem: PAIN - ADULT  Goal: Verbalizes/displays adequate comfort level or baseline comfort level  Description: Interventions:  - Encourage patient to monitor pain and request assistance  - Assess pain using appropriate pain scale  - Administer analgesics based on type and severity of pain and evaluate response  - Implement non-pharmacological measures as appropriate and evaluate response  - Consider cultural and social influences on pain and pain management  - Notify physician/advanced practitioner if interventions unsuccessful or patient reports new pain  Outcome: Progressing     Problem: INFECTION - ADULT  Goal: Absence or prevention of progression during hospitalization  Description: INTERVENTIONS:  - Assess and monitor for signs and symptoms of infection  - Monitor lab/diagnostic results  - Monitor all insertion sites, i.e. indwelling lines, tubes, and drains  - Monitor endotracheal if appropriate and nasal secretions for changes in amount and color  - [place] appropriate cooling/warming therapies per order  - Administer medications as ordered  - Instruct and encourage patient and family to use good hand hygiene technique  - Identify and instruct in appropriate isolation precautions for identified infection/condition  Outcome: Progressing  Goal: Absence of fever/infection during neutropenic period  Description: INTERVENTIONS:  - Monitor WBC    Outcome: Progressing     Problem: SAFETY ADULT  Goal: Patient will remain free of falls  Description: INTERVENTIONS:  - Educate patient/family on patient safety including physical limitations  - Instruct patient to call for assistance with activity   - Consult OT/PT to assist with strengthening/mobility   - Keep Call bell within reach  - Keep bed low and locked with side rails adjusted as appropriate  - Keep care items and personal belongings within reach  - Initiate and maintain comfort rounds  - Make Fall Risk Sign visible to staff  - Offer Toileting every 2 Hours,  in advance of need  - Initiate/Maintain bed alarm  - Obtain necessary fall risk management equipment:   - Apply yellow socks and bracelet for high fall risk patients  - Consider moving patient to room near nurses station  Outcome: Progressing  Goal: Maintain or return to baseline ADL function  Description: INTERVENTIONS:  -  Assess patient's ability to carry out ADLs; assess patient's baseline for ADL function and identify physical deficits which impact ability to perform ADLs (bathing, care of mouth/teeth, toileting, grooming, dressing, etc.)  - Assess/evaluate cause of self-care deficits   - Assess range of motion  - Assess patient's mobility; develop plan if impaired  - Assess patient's need for assistive devices and provide as appropriate  - Encourage maximum independence but intervene and supervise when necessary  - Involve family in performance of ADLs  - Assess for home care needs following discharge   - Consider OT consult to assist with ADL evaluation and planning for discharge  - Provide patient education as appropriate  Outcome: Progressing  Goal: Maintains/Returns to pre admission functional level  Description: INTERVENTIONS:  - Perform AM-PAC 6 Click Basic Mobility/ Daily Activity assessment daily.  - Set and communicate daily mobility goal to care team and patient/family/caregiver.   - Collaborate with rehabilitation services on mobility goals if consulted  - Perform Range of Motion 4 times a day.  - Reposition patient every 2 hours.  - Dangle patient 3 times a day  - Stand patient 3 times a day  - Ambulate patient 3 times a day  - Out of bed to chair 3 times a day   - Out of bed for meals 3 times a day  - Out of bed for toileting  - Record patient progress and toleration of activity level   Outcome: Progressing     Problem: DISCHARGE PLANNING  Goal: Discharge to home or other facility with appropriate resources  Description: INTERVENTIONS:  - Identify barriers to discharge w/patient and caregiver  -  Arrange for needed discharge resources and transportation as appropriate  - Identify discharge learning needs (meds, wound care, etc.)  - Arrange for interpretive services to assist at discharge as needed  - Refer to Case Management Department for coordinating discharge planning if the patient needs post-hospital services based on physician/advanced practitioner order or complex needs related to functional status, cognitive ability, or social support system  Outcome: Progressing     Problem: Knowledge Deficit  Goal: Patient/family/caregiver demonstrates understanding of disease process, treatment plan, medications, and discharge instructions  Description: Complete learning assessment and assess knowledge base.  Interventions:  - Provide teaching at level of understanding  - Provide teaching via preferred learning methods  Outcome: Progressing     Problem: Prexisting or High Potential for Compromised Skin Integrity  Goal: Skin integrity is maintained or improved  Description: INTERVENTIONS:  - Identify patients at risk for skin breakdown  - Assess and monitor skin integrity  - Assess and monitor nutrition and hydration status  - Monitor labs   - Assess for incontinence   - Turn and reposition patient  - Assist with mobility/ambulation  - Relieve pressure over bony prominences  - Avoid friction and shearing  - Provide appropriate hygiene as needed including keeping skin clean and dry  - Evaluate need for skin moisturizer/barrier cream  - Collaborate with interdisciplinary team   - Patient/family teaching  - Consider wound care consult   Outcome: Progressing

## 2024-07-01 NOTE — ASSESSMENT & PLAN NOTE
Patient with no pertinent PMH presented with acute-onset mid-sternal chest pressure after syncopal episode  Initial EKG - ST elevation in the anterior leads  STEMI alert called. Underwent emergent cardiac catheterization, which revealed 100% proximal LAD occlusion. PTCA was performed and drug-eluting stent deployed with flow restored.  Arrived to ICU on nitroglycerin gtt which was discontinued as patient was mildly hypotensive  Lipid panel - elevated LDL    Plan  Cardiology on board, appreciate recommedendations  Continue aspirin, Brillinta, atorvastatin, Lopressor  Continuous cardiopulmonary monitoring  TR band on right wrist with adequate hemostasis  Follow Echo

## 2024-07-02 ENCOUNTER — APPOINTMENT (INPATIENT)
Dept: RADIOLOGY | Facility: HOSPITAL | Age: 64
DRG: 174 | End: 2024-07-02
Payer: COMMERCIAL

## 2024-07-02 PROBLEM — I31.9 PERICARDITIS: Status: ACTIVE | Noted: 2024-07-02

## 2024-07-02 PROBLEM — E87.6 HYPOKALEMIA: Status: RESOLVED | Noted: 2024-07-01 | Resolved: 2024-07-02

## 2024-07-02 LAB
ANION GAP SERPL CALCULATED.3IONS-SCNC: 7 MMOL/L (ref 4–13)
ANION GAP SERPL CALCULATED.3IONS-SCNC: 9 MMOL/L (ref 4–13)
BACTERIA UR QL AUTO: ABNORMAL /HPF
BASOPHILS # BLD AUTO: 0.05 THOUSANDS/ÂΜL (ref 0–0.1)
BASOPHILS # BLD AUTO: 0.05 THOUSANDS/ÂΜL (ref 0–0.1)
BASOPHILS NFR BLD AUTO: 0 % (ref 0–1)
BASOPHILS NFR BLD AUTO: 0 % (ref 0–1)
BILIRUB UR QL STRIP: NEGATIVE
BUN SERPL-MCNC: 15 MG/DL (ref 5–25)
BUN SERPL-MCNC: 15 MG/DL (ref 5–25)
CA-I BLD-SCNC: 0.96 MMOL/L (ref 1.12–1.32)
CALCIUM SERPL-MCNC: 9 MG/DL (ref 8.4–10.2)
CALCIUM SERPL-MCNC: 9.5 MG/DL (ref 8.4–10.2)
CHLORIDE SERPL-SCNC: 102 MMOL/L (ref 96–108)
CHLORIDE SERPL-SCNC: 99 MMOL/L (ref 96–108)
CLARITY UR: CLEAR
CO2 SERPL-SCNC: 19 MMOL/L (ref 21–32)
CO2 SERPL-SCNC: 24 MMOL/L (ref 21–32)
COLOR UR: ABNORMAL
CREAT SERPL-MCNC: 0.86 MG/DL (ref 0.6–1.3)
CREAT SERPL-MCNC: 0.86 MG/DL (ref 0.6–1.3)
EOSINOPHIL # BLD AUTO: 0 THOUSAND/ÂΜL (ref 0–0.61)
EOSINOPHIL # BLD AUTO: 0 THOUSAND/ÂΜL (ref 0–0.61)
EOSINOPHIL NFR BLD AUTO: 0 % (ref 0–6)
EOSINOPHIL NFR BLD AUTO: 0 % (ref 0–6)
ERYTHROCYTE [DISTWIDTH] IN BLOOD BY AUTOMATED COUNT: 11.3 % (ref 11.6–15.1)
ERYTHROCYTE [DISTWIDTH] IN BLOOD BY AUTOMATED COUNT: 11.5 % (ref 11.6–15.1)
EST. AVERAGE GLUCOSE BLD GHB EST-MCNC: 120 MG/DL
GFR SERPL CREATININE-BSD FRML MDRD: 91 ML/MIN/1.73SQ M
GFR SERPL CREATININE-BSD FRML MDRD: 91 ML/MIN/1.73SQ M
GLUCOSE SERPL-MCNC: 130 MG/DL (ref 65–140)
GLUCOSE SERPL-MCNC: 156 MG/DL (ref 65–140)
GLUCOSE UR STRIP-MCNC: ABNORMAL MG/DL
HBA1C MFR BLD: 5.8 %
HCT VFR BLD AUTO: 47.9 % (ref 36.5–49.3)
HCT VFR BLD AUTO: 51.6 % (ref 36.5–49.3)
HGB BLD-MCNC: 16.8 G/DL (ref 12–17)
HGB BLD-MCNC: 17.6 G/DL (ref 12–17)
HGB UR QL STRIP.AUTO: NEGATIVE
IMM GRANULOCYTES # BLD AUTO: 0.1 THOUSAND/UL (ref 0–0.2)
IMM GRANULOCYTES # BLD AUTO: 0.12 THOUSAND/UL (ref 0–0.2)
IMM GRANULOCYTES NFR BLD AUTO: 1 % (ref 0–2)
IMM GRANULOCYTES NFR BLD AUTO: 1 % (ref 0–2)
KETONES UR STRIP-MCNC: NEGATIVE MG/DL
LACTATE SERPL-SCNC: 1.7 MMOL/L (ref 0.5–2)
LACTATE SERPL-SCNC: 2.1 MMOL/L (ref 0.5–2)
LEUKOCYTE ESTERASE UR QL STRIP: NEGATIVE
LYMPHOCYTES # BLD AUTO: 1.38 THOUSANDS/ÂΜL (ref 0.6–4.47)
LYMPHOCYTES # BLD AUTO: 1.69 THOUSANDS/ÂΜL (ref 0.6–4.47)
LYMPHOCYTES NFR BLD AUTO: 7 % (ref 14–44)
LYMPHOCYTES NFR BLD AUTO: 8 % (ref 14–44)
MAGNESIUM SERPL-MCNC: 2.4 MG/DL (ref 1.9–2.7)
MAGNESIUM SERPL-MCNC: 2.6 MG/DL (ref 1.9–2.7)
MCH RBC QN AUTO: 32.6 PG (ref 26.8–34.3)
MCH RBC QN AUTO: 33 PG (ref 26.8–34.3)
MCHC RBC AUTO-ENTMCNC: 34.1 G/DL (ref 31.4–37.4)
MCHC RBC AUTO-ENTMCNC: 35.1 G/DL (ref 31.4–37.4)
MCV RBC AUTO: 93 FL (ref 82–98)
MCV RBC AUTO: 97 FL (ref 82–98)
MONOCYTES # BLD AUTO: 1.23 THOUSAND/ÂΜL (ref 0.17–1.22)
MONOCYTES # BLD AUTO: 1.39 THOUSAND/ÂΜL (ref 0.17–1.22)
MONOCYTES NFR BLD AUTO: 6 % (ref 4–12)
MONOCYTES NFR BLD AUTO: 6 % (ref 4–12)
MUCOUS THREADS UR QL AUTO: ABNORMAL
NEUTROPHILS # BLD AUTO: 17.93 THOUSANDS/ÂΜL (ref 1.85–7.62)
NEUTROPHILS # BLD AUTO: 18.96 THOUSANDS/ÂΜL (ref 1.85–7.62)
NEUTS SEG NFR BLD AUTO: 85 % (ref 43–75)
NEUTS SEG NFR BLD AUTO: 86 % (ref 43–75)
NITRITE UR QL STRIP: NEGATIVE
NON-SQ EPI CELLS URNS QL MICRO: ABNORMAL /HPF
NRBC BLD AUTO-RTO: 0 /100 WBCS
NRBC BLD AUTO-RTO: 0 /100 WBCS
PH UR STRIP.AUTO: 6 [PH]
PHOSPHATE SERPL-MCNC: 1.5 MG/DL (ref 2.3–4.1)
PHOSPHATE SERPL-MCNC: 1.9 MG/DL (ref 2.3–4.1)
PLATELET # BLD AUTO: 308 THOUSANDS/UL (ref 149–390)
PLATELET # BLD AUTO: 322 THOUSANDS/UL (ref 149–390)
PMV BLD AUTO: 10.3 FL (ref 8.9–12.7)
PMV BLD AUTO: 10.4 FL (ref 8.9–12.7)
POTASSIUM SERPL-SCNC: 4.4 MMOL/L (ref 3.5–5.3)
POTASSIUM SERPL-SCNC: 4.5 MMOL/L (ref 3.5–5.3)
PROCALCITONIN SERPL-MCNC: 0.31 NG/ML
PROT UR STRIP-MCNC: ABNORMAL MG/DL
RBC # BLD AUTO: 5.15 MILLION/UL (ref 3.88–5.62)
RBC # BLD AUTO: 5.34 MILLION/UL (ref 3.88–5.62)
RBC #/AREA URNS AUTO: ABNORMAL /HPF
SODIUM SERPL-SCNC: 130 MMOL/L (ref 135–147)
SODIUM SERPL-SCNC: 130 MMOL/L (ref 135–147)
SP GR UR STRIP.AUTO: 1.02 (ref 1–1.03)
UROBILINOGEN UR STRIP-ACNC: <2 MG/DL
WBC # BLD AUTO: 20.69 THOUSAND/UL (ref 4.31–10.16)
WBC # BLD AUTO: 22.21 THOUSAND/UL (ref 4.31–10.16)
WBC #/AREA URNS AUTO: ABNORMAL /HPF

## 2024-07-02 PROCEDURE — 84145 PROCALCITONIN (PCT): CPT

## 2024-07-02 PROCEDURE — 99232 SBSQ HOSP IP/OBS MODERATE 35: CPT | Performed by: INTERNAL MEDICINE

## 2024-07-02 PROCEDURE — 80048 BASIC METABOLIC PNL TOTAL CA: CPT

## 2024-07-02 PROCEDURE — 99233 SBSQ HOSP IP/OBS HIGH 50: CPT | Performed by: INTERNAL MEDICINE

## 2024-07-02 PROCEDURE — 83735 ASSAY OF MAGNESIUM: CPT

## 2024-07-02 PROCEDURE — 97165 OT EVAL LOW COMPLEX 30 MIN: CPT

## 2024-07-02 PROCEDURE — 82330 ASSAY OF CALCIUM: CPT

## 2024-07-02 PROCEDURE — 87040 BLOOD CULTURE FOR BACTERIA: CPT

## 2024-07-02 PROCEDURE — 81001 URINALYSIS AUTO W/SCOPE: CPT

## 2024-07-02 PROCEDURE — 84100 ASSAY OF PHOSPHORUS: CPT

## 2024-07-02 PROCEDURE — 71045 X-RAY EXAM CHEST 1 VIEW: CPT

## 2024-07-02 PROCEDURE — 85025 COMPLETE CBC W/AUTO DIFF WBC: CPT

## 2024-07-02 PROCEDURE — 83605 ASSAY OF LACTIC ACID: CPT

## 2024-07-02 RX ORDER — CLOPIDOGREL BISULFATE 75 MG/1
75 TABLET ORAL DAILY
Status: DISCONTINUED | OUTPATIENT
Start: 2024-07-04 | End: 2024-07-03 | Stop reason: HOSPADM

## 2024-07-02 RX ORDER — AMLODIPINE BESYLATE 5 MG/1
5 TABLET ORAL DAILY
Qty: 30 TABLET | Refills: 0 | Status: SHIPPED | OUTPATIENT
Start: 2024-07-03 | End: 2024-08-02

## 2024-07-02 RX ORDER — CALCIUM GLUCONATE 20 MG/ML
1 INJECTION, SOLUTION INTRAVENOUS ONCE
Status: DISCONTINUED | OUTPATIENT
Start: 2024-07-02 | End: 2024-07-02

## 2024-07-02 RX ORDER — CLOPIDOGREL BISULFATE 75 MG/1
300 TABLET ORAL ONCE
Status: COMPLETED | OUTPATIENT
Start: 2024-07-03 | End: 2024-07-03

## 2024-07-02 RX ORDER — METOPROLOL SUCCINATE 25 MG/1
25 TABLET, EXTENDED RELEASE ORAL 2 TIMES DAILY
Qty: 180 TABLET | Refills: 0 | Status: SHIPPED | OUTPATIENT
Start: 2024-07-02 | End: 2024-09-30

## 2024-07-02 RX ORDER — CALCIUM GLUCONATE 20 MG/ML
1 INJECTION, SOLUTION INTRAVENOUS ONCE
Status: COMPLETED | OUTPATIENT
Start: 2024-07-02 | End: 2024-07-02

## 2024-07-02 RX ORDER — LOSARTAN POTASSIUM 25 MG/1
25 TABLET ORAL DAILY
Qty: 90 TABLET | Refills: 0 | Status: SHIPPED | OUTPATIENT
Start: 2024-07-03 | End: 2024-10-01

## 2024-07-02 RX ORDER — COLCHICINE 0.6 MG/1
0.6 TABLET ORAL 2 TIMES DAILY
Qty: 60 TABLET | Refills: 0 | Status: SHIPPED | OUTPATIENT
Start: 2024-07-02 | End: 2024-08-01

## 2024-07-02 RX ORDER — CLOPIDOGREL BISULFATE 75 MG/1
75 TABLET ORAL DAILY
Qty: 90 TABLET | Refills: 0 | Status: SHIPPED | OUTPATIENT
Start: 2024-07-04

## 2024-07-02 RX ORDER — PANTOPRAZOLE SODIUM 40 MG/1
40 TABLET, DELAYED RELEASE ORAL DAILY
Qty: 90 TABLET | Refills: 0 | Status: SHIPPED | OUTPATIENT
Start: 2024-07-02 | End: 2024-09-30

## 2024-07-02 RX ORDER — SODIUM CHLORIDE 9 MG/ML
75 INJECTION, SOLUTION INTRAVENOUS ONCE
Status: COMPLETED | OUTPATIENT
Start: 2024-07-02 | End: 2024-07-02

## 2024-07-02 RX ORDER — ATORVASTATIN CALCIUM 80 MG/1
80 TABLET, FILM COATED ORAL DAILY
Qty: 90 TABLET | Refills: 0 | Status: SHIPPED | OUTPATIENT
Start: 2024-07-03

## 2024-07-02 RX ADMIN — Medication 2 TABLET: at 08:00

## 2024-07-02 RX ADMIN — VANCOMYCIN HYDROCHLORIDE 1750 MG: 1 INJECTION, POWDER, LYOPHILIZED, FOR SOLUTION INTRAVENOUS at 02:54

## 2024-07-02 RX ADMIN — POLYETHYLENE GLYCOL 3350 17 G: 17 POWDER, FOR SOLUTION ORAL at 08:00

## 2024-07-02 RX ADMIN — SODIUM CHLORIDE 75 ML/HR: 0.9 INJECTION, SOLUTION INTRAVENOUS at 02:58

## 2024-07-02 RX ADMIN — TICAGRELOR 90 MG: 90 TABLET ORAL at 21:25

## 2024-07-02 RX ADMIN — Medication 2 TABLET: at 11:38

## 2024-07-02 RX ADMIN — DOCUSATE SODIUM 100 MG: 100 CAPSULE, LIQUID FILLED ORAL at 16:10

## 2024-07-02 RX ADMIN — COLCHICINE 0.6 MG: 0.6 TABLET ORAL at 08:01

## 2024-07-02 RX ADMIN — METOPROLOL SUCCINATE 25 MG: 25 TABLET, FILM COATED, EXTENDED RELEASE ORAL at 21:25

## 2024-07-02 RX ADMIN — CALCIUM GLUCONATE 1 G: 20 INJECTION, SOLUTION INTRAVENOUS at 04:49

## 2024-07-02 RX ADMIN — ASPIRIN 81 MG: 81 TABLET, COATED ORAL at 08:02

## 2024-07-02 RX ADMIN — LOSARTAN POTASSIUM 25 MG: 25 TABLET, FILM COATED ORAL at 08:02

## 2024-07-02 RX ADMIN — AMLODIPINE BESYLATE 5 MG: 5 TABLET ORAL at 08:02

## 2024-07-02 RX ADMIN — Medication 2 TABLET: at 16:10

## 2024-07-02 RX ADMIN — ENOXAPARIN SODIUM 40 MG: 40 INJECTION SUBCUTANEOUS at 08:00

## 2024-07-02 RX ADMIN — DOCUSATE SODIUM 100 MG: 100 CAPSULE, LIQUID FILLED ORAL at 08:01

## 2024-07-02 RX ADMIN — ATORVASTATIN CALCIUM 80 MG: 80 TABLET, FILM COATED ORAL at 08:01

## 2024-07-02 RX ADMIN — CEFEPIME 2000 MG: 2 INJECTION, POWDER, FOR SOLUTION INTRAVENOUS at 01:32

## 2024-07-02 RX ADMIN — COLCHICINE 0.6 MG: 0.6 TABLET ORAL at 16:10

## 2024-07-02 RX ADMIN — TICAGRELOR 90 MG: 90 TABLET ORAL at 08:02

## 2024-07-02 RX ADMIN — METOPROLOL SUCCINATE 25 MG: 25 TABLET, FILM COATED, EXTENDED RELEASE ORAL at 08:01

## 2024-07-02 RX ADMIN — CEFEPIME 2000 MG: 2 INJECTION, POWDER, FOR SOLUTION INTRAVENOUS at 12:30

## 2024-07-02 RX ADMIN — Medication 2 TABLET: at 21:25

## 2024-07-02 NOTE — OCCUPATIONAL THERAPY NOTE
Occupational Therapy Evaluation     Patient Name: Nolan Escoabr  Today's Date: 7/2/2024  Problem List  Principal Problem:    Acute ST elevation myocardial infarction (STEMI) involving left anterior descending (LAD) coronary artery (HCC)  Active Problems:    Dyslipidemia    Hypokalemia    Hypertension    Past Medical History  No past medical history on file.  Past Surgical History  Past Surgical History:   Procedure Laterality Date    CARDIAC CATHETERIZATION N/A 6/30/2024    Procedure: Cardiac pci;  Surgeon: Jovany Wise MD;  Location: [facility];  Service: Cardiology    CARDIAC CATHETERIZATION N/A 6/30/2024    Procedure: Cardiac Coronary Angiogram;  Surgeon: Jovany Wise MD;  Location: [facility];  Service: Cardiology    CARDIAC CATHETERIZATION N/A 6/30/2024    Procedure: Cardiac IVUS;  Surgeon: Jovany Wise MD;  Location: [facility];  Service: Cardiology    CARDIAC CATHETERIZATION Left 6/30/2024    Procedure: Cardiac Left Heart Cath;  Surgeon: Jovany Wise MD;  Location: [facility];  Service: Cardiology           07/02/24 0918   OT Last Visit   OT Visit Date 07/02/24   Note Type   Note type Evaluation   Pain Assessment   Pain Assessment Tool 0-10   Pain Score 3   Pain Location/Orientation Location: Chest   Pain Onset/Description Other (Comment)  (when coughing)   Patient's Stated Pain Goal No pain   Hospital Pain Intervention(s) Repositioned;Ambulation/increased activity;Emotional support;Rest   Multiple Pain Sites No   Restrictions/Precautions   Weight Bearing Precautions Per Order No   Other Precautions Telemetry;Pain   Home Living   Type of Home House   Home Layout Two level;Performs ADLs on one level;Able to live on main level with bedroom/bathroom  (3 WILLEM)   Bathroom Shower/Tub Tub/shower unit   Bathroom Toilet Standard   Additional Comments Pt lives alone in a two level house with 3 WILLEM and 1st floor setup.   Prior Function   Level of [place] Independent  with ADLs;Independent with functional mobility;Independent with IADLS   Lives With Alone  (Pt reports living alone. Chart indicates pt lives w/ niece)   IADLs Independent with driving;Independent with meal prep;Independent with medication management   Falls in the last 6 months 0   Vocational   (Pt reports not currently working)   Comments At baseline, pt was I w/ ADLs, IADLs, and functional transfers/mobility w/o use of AD. (+) . Denies falls PTA.   Lifestyle   Autonomy At baseline, pt was I w/ ADLs, IADLs, and functional transfers/mobility w/o use of AD. (+) . Denies falls PTA.   Reciprocal Relationships Niece   Service to Others Pt reports not currently working   ADL   Where Assessed Edge of bed   Eating Assistance 7  Independent   Grooming Assistance 7  Independent   UB Bathing Assistance 6  Modified Independent   LB Bathing Assistance 6  Modified Independent   UB Dressing Assistance 6  Modified independent   LB Dressing Assistance 6  Modified independent   Toileting Assistance  6  Modified independent   Bed Mobility   Supine to Sit 6  Modified independent   Additional items HOB elevated;Bedrails;Increased time required   Sit to Supine Unable to assess   Transfers   Sit to Stand 6  Modified independent   Additional items Bedrails;Increased time required   Stand to Sit 6  Modified independent   Additional items Increased time required   Functional Mobility   Functional Mobility 7  Independent   Additional Comments w/o use of AD. No LOBs noted   Balance   Static Sitting Normal   Dynamic Sitting Good   Static Standing Fair +   Dynamic Standing Fair +   Ambulatory Fair +   Activity Tolerance   Activity Tolerance Patient tolerated treatment well   Medical Staff Made Aware TREVA Tay   Nurse Made Aware yes   RUE Assessment   RUE Assessment WFL  (4+/5 throughout)   LUE Assessment   LUE Assessment WFL  (4+/5 throughout)   Hand Function   Gross Motor Coordination Functional   Fine Motor Coordination Functional    Sensation   Light Touch No apparent deficits   Proprioception   Proprioception No apparent deficits   Vision - Complex Assessment   Acuity Able to read clock/calendar on wall without difficulty;Able to read employee name badge without difficulty   Psychosocial   Psychosocial (WDL) WDL   Perception   Inattention/Neglect Appears intact   Cognition   Overall Cognitive Status WFL   Arousal/Participation Alert;Cooperative   Attention Within functional limits   Orientation Level Oriented X4   Memory Within functional limits   Following Commands Follows all commands and directions without difficulty   Comments Primarily Chinese speaking   Assessment   Prognosis Good   Assessment Pt is a 64 y.o. male seen for OT evaluation s/p adm to [facility] on [facility]30[facility] w/ Acute STEMI involving left anterior descending coronary artery. Pt Underwent emergent cardiac catheterization, which revealed 100% proximal LAD occlusion. PTCA was performed and drug-eluting stent deployed with flow restored. Pt with active OT orders and activity orders for OOB to chair. Pt lives alone in a two level house with 3 WILLEM and 1st floor setup. At baseline, pt was I w/ ADLs, IADLs, and functional transfers/mobility w/o use of AD. (+) . Denies falls PTA. Upon evaluation, pt currently functioning at a Mod I level for ADLs, Mod I for bed mobility, Mod I for transfers, and Independent for functional mobility 2* the following deficits impacting occupational performance: increased pain. Pt with the following personal factors of: WILLEM home environment, limited home support, and language barrier. Despite above mentioned deficits and personal factors, pt is functioning near baseline level of performance. Limited ADL deficits. No further acute OT needs identified at this time. Recommend continued mobilization with hospital staff and restorative program while in the hospital to increase pt’s endurance and strength upon D/C. The patient's raw score on  the AM-PAC Daily Activity Inpatient Short Form is 24. A raw score of greater than or equal to 19 suggests the patient may benefit from discharge to home. Please refer to the recommendation of the Occupational Therapist for safe discharge planning. D/C pt from OT caseload at this time.   Goals   Patient Goals To go home   Plan   OT Frequency Eval only  (D/C OT)   Discharge Recommendation   Rehab Resource Intensity Level, OT No post-acute rehabilitation needs   AM-PAC Daily Activity Inpatient   Lower Body Dressing 4   Bathing 4   Toileting 4   Upper Body Dressing 4   Grooming 4   Eating 4   Daily Activity Raw Score 24   Daily Activity Standardized Score (Calc for Raw Score >=11) 57.54   AM-PAC Applied Cognition Inpatient   Following a Speech/Presentation 4   Understanding Ordinary Conversation 4   Taking Medications 4   Remembering Where Things Are Placed or Put Away 4   Remembering List of 4-5 Errands 4   Taking Care of Complicated Tasks 4   Applied Cognition Raw Score 24   Applied Cognition Standardized Score 62.21       Michelle Aranda, OTR/L

## 2024-07-02 NOTE — PROGRESS NOTES
[facility]  Progress Note  Name: Nolan Escobar I  MRN: [id number]  Unit/Bed#: [place] [address] -01 I Date of Admission: 6/30/2024   Date of Service: 7/2/2024 I Hospital Day: 2    Assessment & Plan   * Acute ST elevation myocardial infarction (STEMI) involving left anterior descending (LAD) coronary artery (HCC)  Assessment & Plan  EKG with ST elevation in the anterior leads, cardiac catheterization with 100% proximal LAD occlusion  Cardiac stent deployed, patient initially in the ICU on nitroglycerin drip  Continue dual antiplatelet therapy x 1 year  Due to lack of insurance patient will be transition to Plavix with Plavix load 300 mg in the morning and 75 mg thereafter  Discussed with patient, unfortunately Brilinta is unaffordable  Continue statin  Continue beta-blocker  Patient will benefit from cardiac rehab at discharge    Post catherization, The left ventricular ejection fraction is 31% by biplane measurement.   Systolic function is moderately reduced  Diastolic function is mildly abnormal, consistent with grade I (abnormal) relaxation.     Pericarditis  Assessment & Plan  Continue colchicine  Start Protonix given daily NSAID use      Hypertension  Assessment & Plan  Continue amlodopine, losartan, Metoprolol      Dyslipidemia  Assessment & Plan  Continue Lipitor 80 mg  LDL: 147                 Hospital Course:     64-year-old male patient presenting with chest pain, found to have acute ST segment myocardial infarction    Assessment:      Principal Problem:    Acute ST elevation myocardial infarction (STEMI) involving left anterior descending (LAD) coronary artery (HCC)  Active Problems:    Dyslipidemia    Hypertension    Pericarditis      Plan:    Discharge planning  Continue cardiac medications  Case management assistance for prescription drug coverage  Check labs in a.m.  Cardiac rehab at discharge  Repletion of electrolytes       VTE Pharmacologic Prophylaxis:    Pharmacologic: Enoxaparin (Lovenox)  Mechanical VTE Prophylaxis in Place: Yes    AM-PAC Basic Mobility:  Basic Mobility Inpatient Raw Score: 24    JH-HLM Achieved: 6: Walk 10 steps or more  JH-HLM Goal: 8: Walk 250 feet or more    HLM Goal listed above. Continue with multidisciplinary rounding and encourage appropriate mobility to improve upon HLM goals.         Patient Centered Rounds: Case discussed and reviewed with nursing    Discussions with Specialists or Other Care Team Provider: Case management, cardiology    Education and Discussions with Family / Patient: Patient updating family    Time Spent for Care: 80 minutes.  More than 50% of total time spent on counseling and coordination of care as described above.    Current Length of Stay: 2 day(s)    Current Patient Status: Inpatient   Certification Statement: The patient will continue to require additional inpatient hospital stay due to need for postcatheterization care    Discharge Plan / Estimated Discharge Date: Tomorrow    Code Status: Level 1 - Full Code      Subjective:   Seen and examined, no acute complaints.  No nausea no vomiting, denies any chest pain    A complete and comprehensive 14 point organ system review has been performed and all other systems are negative other than stated above.    Objective:     Vitals:   Temp (24hrs), Av.9 °F (37.7 °C), Min:98.5 °F (36.9 °C), Max:101 °F (38.3 °C)    Temp:  [98.5 °F (36.9 °C)-101 °F (38.3 °C)] 99.2 °F (37.3 °C)  HR:  [] 106  Resp:  [16-36] 16  BP: (107-171)/() 107/74  SpO2:  [91 %-98 %] 93 %  Body mass index is 22.36 kg/m².     Input and Output Summary (last 24 hours):       Intake/Output Summary (Last 24 hours) at 2024 1613  Last data filed at 2024 1137  Gross per 24 hour   Intake 120 ml   Output 1250 ml   Net -1130 ml       Physical Exam:     General: well appearing, no acute distress  HEENT: atraumatic, PERRLA, moist mucosa, normal pharynx, normal tonsils and adenoids, normal  tongue, no fluid in sinuses  Neck: Trachea midline, no carotid bruit, no masses  Respiratory: normal chest wall expansion, CTA B, no r/r/w, no rubs  Cardiovascular: RRR, no m/r/g, Normal S1 and S2  Abdomen: Soft, non-tender, non-distended, normal bowel sounds in all quadrants, no hepatosplenomegaly, no tympany  Rectal: deferred  Musculoskeletal: normal ROM in upper and lower extremities  Integumentary: warm, dry, and pink, with no rash, purpura, or petechia  Heme/Lymph: no lymphadenopathy, no bruises  Neurological: Cranial Nerves II-XII grossly intact  Psychiatric: cooperative with normal mood, affect, and cognition      Additional Data:     Labs:    Results from last 7 days   Lab Units 07/02/24  0402   WBC Thousand/uL 22.21*   HEMOGLOBIN g/dL 17.6*   HEMATOCRIT % 51.6*   PLATELETS Thousands/uL 308   SEGS PCT % 85*   LYMPHO PCT % 8*   MONO PCT % 6   EOS PCT % 0     Results from last 7 days   Lab Units 07/02/24  0402   POTASSIUM mmol/L 4.5   CHLORIDE mmol/L 102   CO2 mmol/L 19*   BUN mg/dL 15   CREATININE mg/dL 0.86   CALCIUM mg/dL 9.0     Results from last 7 days   Lab Units 06/30/24  1446   INR  1.03       * I Have Reviewed All Lab Data Listed Above.  * Additional Pertinent Lab Tests Reviewed: All Labs For Current Hospital Admission Reviewed    Imaging:    Imaging Reports Reviewed Today Include: No new imaging  Imaging Personally Reviewed by Myself Includes: No new imaging    Recent Cultures (last 7 days):     Results from last 7 days   Lab Units 07/02/24  0110 07/02/24  0105   BLOOD CULTURE  Received in Microbiology Lab. Culture in Progress. Received in Microbiology Lab. Culture in Progress.       Last 24 Hours Medication List:   Current Facility-Administered Medications   Medication Dose Route Frequency Provider Last Rate    acetaminophen  975 mg Oral Q6H PRN Jennifer Sood MD      aluminum-magnesium hydroxide-simethicone  30 mL Oral Q6H PRN Jennifer Sood MD      amLODIPine  5 mg Oral Daily Jennifer Sood MD       aspirin  81 mg Oral Daily Jennifer Sood MD      atorvastatin  80 mg Oral Daily Jennifer Sood MD      [START ON 7/3/2024] clopidogrel  300 mg Oral Once Sly Alston DO      [START ON 7/4/2024] clopidogrel  75 mg Oral Daily Sly Alston DO      colchicine  0.6 mg Oral BID Jennifer Sood MD      docusate sodium  100 mg Oral BID Jennifer Sood MD      enoxaparin  40 mg Subcutaneous [facility]      labetalol  10 mg Intravenous Q6H PRN Jennifer Sood MD      lidocaine  1 patch Topical Daily Jennifer Sood MD      losartan  25 mg Oral Daily Jennifer Sood MD      metoprolol succinate  25 mg Oral BID Jennifer Sood MD      nitroglycerin  0.4 mg Sublingual Q5 Min PRN Jennifer Sood MD      ondansetron  4 mg Intravenous Q6H PRN Jennifer Sood MD      polyethylene glycol  17 g Oral Daily Jennifer Sood MD      potassium-sodium phosphateS  2 tablet Oral 4x Daily (with meals and at bedtime) DENNYS Vilchis      sodium chloride (PF)  3 mL Intravenous Q1H PRN Jennifer Sood MD      ticagrelor  90 mg Oral Q12H [facility] Sly Alston DO         AM-PAC Basic Mobility:  Basic Mobility Inpatient Raw Score: 24    JH-HLM Achieved: 6: Walk 10 steps or more  JH-HLM Goal: 8: Walk 250 feet or more    HLM Goal listed above. Continue with multidisciplinary rounding and encourage appropriate mobility to improve upon HLM goals.     Today, Patient Was Seen By: Sly Alston DO    ** Please Note: This note was completed in part utilizing [employer] software dictation.  Grammatical errors, random word insertions, spelling mistakes, and incomplete sentences may be an occasional consequence of this system secondary to software limitations, ambient noise, and hardware issues.  If you have any questions or concerns about the content, text, or information contained within the body of this dictation, please contact the provider for clarification. **

## 2024-07-02 NOTE — PLAN OF CARE
Problem: PAIN - ADULT  Goal: Verbalizes/displays adequate comfort level or baseline comfort level  Description: Interventions:  - Encourage patient to monitor pain and request assistance  - Assess pain using appropriate pain scale  - Administer analgesics based on type and severity of pain and evaluate response  - Implement non-pharmacological measures as appropriate and evaluate response  - Consider cultural and social influences on pain and pain management  - Notify physician/advanced practitioner if interventions unsuccessful or patient reports new pain  Outcome: Progressing     Problem: INFECTION - ADULT  Goal: Absence or prevention of progression during hospitalization  Description: INTERVENTIONS:  - Assess and monitor for signs and symptoms of infection  - Monitor lab/diagnostic results  - Monitor all insertion sites, i.e. indwelling lines, tubes, and drains  - Monitor endotracheal if appropriate and nasal secretions for changes in amount and color  - [place] appropriate cooling/warming therapies per order  - Administer medications as ordered  - Instruct and encourage patient and family to use good hand hygiene technique  - Identify and instruct in appropriate isolation precautions for identified infection/condition  Outcome: Progressing  Goal: Absence of fever/infection during neutropenic period  Description: INTERVENTIONS:  - Monitor WBC    Outcome: Progressing     Problem: SAFETY ADULT  Goal: Patient will remain free of falls  Description: INTERVENTIONS:  - Educate patient/family on patient safety including physical limitations  - Instruct patient to call for assistance with activity   - Consult OT/PT to assist with strengthening/mobility   - Keep Call bell within reach  - Keep bed low and locked with side rails adjusted as appropriate  - Keep care items and personal belongings within reach  - Initiate and maintain comfort rounds  - Make Fall Risk Sign visible to staff  - Offer Toileting every 2 Hours,  in advance of need  - Initiate/Maintain bed alarm  - Obtain necessary fall risk management equipment:   - Apply yellow socks and bracelet for high fall risk patients  - Consider moving patient to room near nurses station  Outcome: Progressing  Goal: Maintain or return to baseline ADL function  Description: INTERVENTIONS:  -  Assess patient's ability to carry out ADLs; assess patient's baseline for ADL function and identify physical deficits which impact ability to perform ADLs (bathing, care of mouth/teeth, toileting, grooming, dressing, etc.)  - Assess/evaluate cause of self-care deficits   - Assess range of motion  - Assess patient's mobility; develop plan if impaired  - Assess patient's need for assistive devices and provide as appropriate  - Encourage maximum independence but intervene and supervise when necessary  - Involve family in performance of ADLs  - Assess for home care needs following discharge   - Consider OT consult to assist with ADL evaluation and planning for discharge  - Provide patient education as appropriate  Outcome: Progressing  Goal: Maintains/Returns to pre admission functional level  Description: INTERVENTIONS:  - Perform AM-PAC 6 Click Basic Mobility/ Daily Activity assessment daily.  - Set and communicate daily mobility goal to care team and patient/family/caregiver.   - Collaborate with rehabilitation services on mobility goals if consulted  - Perform Range of Motion 4 times a day.  - Reposition patient every 2 hours.  - Dangle patient 3 times a day  - Stand patient 3 times a day  - Ambulate patient 3 times a day  - Out of bed to chair 3 times a day   - Out of bed for meals 3 times a day  - Out of bed for toileting  - Record patient progress and toleration of activity level   Outcome: Progressing     Problem: DISCHARGE PLANNING  Goal: Discharge to home or other facility with appropriate resources  Description: INTERVENTIONS:  - Identify barriers to discharge w/patient and caregiver  -  Arrange for needed discharge resources and transportation as appropriate  - Identify discharge learning needs (meds, wound care, etc.)  - Arrange for interpretive services to assist at discharge as needed  - Refer to Case Management Department for coordinating discharge planning if the patient needs post-hospital services based on physician/advanced practitioner order or complex needs related to functional status, cognitive ability, or social support system  Outcome: Progressing     Problem: Knowledge Deficit  Goal: Patient/family/caregiver demonstrates understanding of disease process, treatment plan, medications, and discharge instructions  Description: Complete learning assessment and assess knowledge base.  Interventions:  - Provide teaching at level of understanding  - Provide teaching via preferred learning methods  Outcome: Progressi  Problem: Prexisting or High Potential for Compromised Skin Integrity  Goal: Skin integrity is maintained or improved  Description: INTERVENTIONS:  - Identify patients at risk for skin breakdown  - Assess and monitor skin integrity  - Assess and monitor nutrition and hydration status  - Monitor labs   - Assess for incontinence   - Turn and reposition patient  - Assist with mobility/ambulation  - Relieve pressure over bony prominences  - Avoid friction and shearing  - Provide appropriate hygiene as needed including keeping skin clean and dry  - Evaluate need for skin moisturizer/barrier cream  - Collaborate with interdisciplinary team   - Patient/family teaching  - Consider wound care consult   Outcome: Progressing

## 2024-07-02 NOTE — CASE MANAGEMENT
Case Management Discharge Planning Note    Patient name Nolan Escobar  Location [facility] 2 M* MRN [id number]  : 1960 Date 2024       Current Admission Date: 2024  Current Admission Diagnosis:Acute ST elevation myocardial infarction (STEMI) involving left anterior descending (LAD) coronary artery (HCC)   Patient Active Problem List    Diagnosis Date Noted Date Diagnosed    Dyslipidemia 2024     Hypokalemia 2024     Hypertension 2024     Acute ST elevation myocardial infarction (STEMI) involving left anterior descending (LAD) coronary artery (HCC) 2024       LOS (days): 2  Geometric Mean LOS (GMLOS) (days): 2  Days to GMLOS:0.3     OBJECTIVE:  Risk of Unplanned Readmission Score: 12.03         Current admission status: Inpatient   Preferred Pharmacy:   [id number] [place] - [address] [facility]  [address] [place] [address]  Phone: [phone] Fax: [phone]    Primary Care Provider: No primary care provider on file.    Primary Insurance: RACHEL CHAVEZ PENDING  Secondary Insurance:     DISCHARGE DETAILS:    Other Referral/Resources/Interventions Provided:  Interventions: PCP  Referral Comments: PCP: Tunde Eller    Additional Comments: CM contacted Tunde Eller to make PCP appointment for patient; CM made transition of care appointment for  at 2:00P.M. at [facility]. CM to follow for further discharge planning needs.       Breanna Burgess,

## 2024-07-02 NOTE — PLAN OF CARE
Problem: PAIN - ADULT  Goal: Verbalizes/displays adequate comfort level or baseline comfort level  Description: Interventions:  - Encourage patient to monitor pain and request assistance  - Assess pain using appropriate pain scale  - Administer analgesics based on type and severity of pain and evaluate response  - Implement non-pharmacological measures as appropriate and evaluate response  - Consider cultural and social influences on pain and pain management  - Notify physician/advanced practitioner if interventions unsuccessful or patient reports new pain  7/2/2024 0718 by Jasvir Yoder RN  Outcome: Progressing  7/2/2024 0718 by Jasvir Yoder RN  Outcome: Progressing     Problem: INFECTION - ADULT  Goal: Absence or prevention of progression during hospitalization  Description: INTERVENTIONS:  - Assess and monitor for signs and symptoms of infection  - Monitor lab/diagnostic results  - Monitor all insertion sites, i.e. indwelling lines, tubes, and drains  - Monitor endotracheal if appropriate and nasal secretions for changes in amount and color  - [place] appropriate cooling/warming therapies per order  - Administer medications as ordered  - Instruct and encourage patient and family to use good hand hygiene technique  - Identify and instruct in appropriate isolation precautions for identified infection/condition  7/2/2024 0718 by Jasvir Yoder RN  Outcome: Progressing  7/2/2024 0718 by Jasvir Yoder RN  Outcome: Progressing  Goal: Absence of fever/infection during neutropenic period  Description: INTERVENTIONS:  - Monitor WBC    7/2/2024 0718 by Jasvir Yoder RN  Outcome: Progressing  7/2/2024 0718 by Jasvir Yoder RN  Outcome: Progressing     Problem: SAFETY ADULT  Goal: Patient will remain free of falls  Description: INTERVENTIONS:  - Educate patient/family on patient safety including physical limitations  - Instruct patient to call for assistance with activity   - Consult OT/PT to assist  with strengthening/mobility   - Keep Call bell within reach  - Keep bed low and locked with side rails adjusted as appropriate  - Keep care items and personal belongings within reach  - Initiate and maintain comfort rounds  - Make Fall Risk Sign visible to staff  - Offer Toileting every 2 Hours, in advance of need  - Initiate/Maintain bed alarm  - Obtain necessary fall risk management equipment:   - Apply yellow socks and bracelet for high fall risk patients  - Consider moving patient to room near nurses station  7/2/2024 0718 by Jasvir Yoder RN  Outcome: Progressing  7/2/2024 0718 by Jasvir Yoder RN  Outcome: Progressing  Goal: Maintain or return to baseline ADL function  Description: INTERVENTIONS:  -  Assess patient's ability to carry out ADLs; assess patient's baseline for ADL function and identify physical deficits which impact ability to perform ADLs (bathing, care of mouth/teeth, toileting, grooming, dressing, etc.)  - Assess/evaluate cause of self-care deficits   - Assess range of motion  - Assess patient's mobility; develop plan if impaired  - Assess patient's need for assistive devices and provide as appropriate  - Encourage maximum independence but intervene and supervise when necessary  - Involve family in performance of ADLs  - Assess for home care needs following discharge   - Consider OT consult to assist with ADL evaluation and planning for discharge  - Provide patient education as appropriate  7/2/2024 0718 by Jasvir Yoder RN  Outcome: Progressing  7/2/2024 0718 by Jasvir Yodre RN  Outcome: Progressing  Goal: Maintains/Returns to pre admission functional level  Description: INTERVENTIONS:  - Perform AM-PAC 6 Click Basic Mobility/ Daily Activity assessment daily.  - Set and communicate daily mobility goal to care team and patient/family/caregiver.   - Collaborate with rehabilitation services on mobility goals if consulted  - Perform Range of Motion 4 times a day.  - Reposition patient  every 2 hours.  - Dangle patient 3 times a day  - Stand patient 3 times a day  - Ambulate patient 3 times a day  - Out of bed to chair 3 times a day   - Out of bed for meals 3 times a day  - Out of bed for toileting  - Record patient progress and toleration of activity level   7/2/2024 0718 by Jasvir Yoder RN  Outcome: Progressing  7/2/2024 0718 by Jasvir Yoder RN  Outcome: Progressing     Problem: DISCHARGE PLANNING  Goal: Discharge to home or other facility with appropriate resources  Description: INTERVENTIONS:  - Identify barriers to discharge w/patient and caregiver  - Arrange for needed discharge resources and transportation as appropriate  - Identify discharge learning needs (meds, wound care, etc.)  - Arrange for interpretive services to assist at discharge as needed  - Refer to Case Management Department for coordinating discharge planning if the patient needs post-hospital services based on physician/advanced practitioner order or complex needs related to functional status, cognitive ability, or social support system  7/2/2024 0718 by Jasvir Yoder RN  Outcome: Progressing  7/2/2024 0718 by Jasvir Yoder RN  Outcome: Progressing     Problem: Knowledge Deficit  Goal: Patient/family/caregiver demonstrates understanding of disease process, treatment plan, medications, and discharge instructions  Description: Complete learning assessment and assess knowledge base.  Interventions:  - Provide teaching at level of understanding  - Provide teaching via preferred learning methods  7/2/2024 0718 by Jasvir Yoder RN  Outcome: Progressing  7/2/2024 0718 by Jasvir Yoder RN  Outcome: Progressing     Problem: Prexisting or High Potential for Compromised Skin Integrity  Goal: Skin integrity is maintained or improved  Description: INTERVENTIONS:  - Identify patients at risk for skin breakdown  - Assess and monitor skin integrity  - Assess and monitor nutrition and hydration status  - Monitor labs   -  Assess for incontinence   - Turn and reposition patient  - Assist with mobility/ambulation  - Relieve pressure over bony prominences  - Avoid friction and shearing  - Provide appropriate hygiene as needed including keeping skin clean and dry  - Evaluate need for skin moisturizer/barrier cream  - Collaborate with interdisciplinary team   - Patient/family teaching  - Consider wound care consult   7/2/2024 0718 by Jasvir Yoder RN  Outcome: Progressing  7/2/2024 0718 by Jasvir Yoder RN  Outcome: Progressing

## 2024-07-02 NOTE — PROGRESS NOTES
Cardiology Progress Note   MD Onel Wagner MD, FACC  Michael Baugh DO, [facility]  MD Ester Laird DO, [facility]  Arnaldo Carlin DO, [facility]  ----------------------------------------------------------------  [address] [place] [address]    Nolan Escobar 64 y.o. male MRN: [id number]  Unit/Bed#: [address] 205-01 Encounter: [id number]      ASSESSMENT:   Anterior STEMI  s/p DOMENICO-pLAD, June 30, 2024  Ischemic cardiomyopathy  LVEF 31%, mild LVH, grade 1 diastolic dysfunction, apical dyskinesis, akinesis of the anteroseptal wall, anterior wall from mid to apex as well as the inferoapical and apical lateral walls, mild MR/TR, July 2024  Accelerated hypertension  Dyslipidemia w/ LDL 47 mg/dL, HDL 60 mg/dL,  mg/dL, June 2024  Hypokalemia    PLAN:  Chest pain remains resolved  Minimum 1 year dual antiplatelet therapy; transition to Plavix tomorrow with 300 mg x 1 and then 75 mg daily thereafter  Lifelong aspirin  Continue colchicine 0.6 mg twice daily  Continue losartan, Toprol-XL and amlodipine  Continue high intensity statin  Referral for cardiac rehabilitation  Echocardiogram shows severely reduced left ventricular systolic function  Due to insurance issues, cannot be placed on Jardiance or Entresto  May ultimately consider initiating spironolactone as outpatient  Hopeful discharge from [facility] tomorrow    Signed: Michael Baugh DO, [facility], CESAR, FACP      History of Present Illness:  Patient seen and examined.  Chest discomfort resolved.  Feeling back to his baseline.  Denies chest pain, pressure, tightness or squeezing.  Denies lightheadedness, dizziness or palpitations.  Denies lower extremity swelling, orthopnea or paroxysmal nocturnal dyspnea.      Review of Systems:  Review of Systems   Constitutional: Negative for decreased appetite, fever, weight gain and weight loss.   HENT:  Negative for congestion and sore throat.    Eyes:   Negative for visual disturbance.   Cardiovascular:  Negative for chest pain, dyspnea on exertion, leg swelling, near-syncope and palpitations.   Respiratory:  Negative for cough and shortness of breath.    Hematologic/Lymphatic: Negative for bleeding problem.   Skin:  Negative for rash.   Musculoskeletal:  Negative for myalgias and neck pain.   Gastrointestinal:  Negative for abdominal pain and nausea.   Neurological:  Negative for light-headedness and weakness.   Psychiatric/Behavioral:  Negative for depression.        No Known Allergies    No current facility-administered medications on file prior to encounter.     No current outpatient medications on file prior to encounter.        Current Facility-Administered Medications   Medication Dose Route Frequency Provider Last Rate    acetaminophen  975 mg Oral Q6H PRN Jennifer Sood MD      aluminum-magnesium hydroxide-simethicone  30 mL Oral Q6H PRN Jennifer Sood MD      amLODIPine  5 mg Oral Daily Jennifer Sood MD      aspirin  81 mg Oral Daily Jennifer Sood MD      atorvastatin  80 mg Oral Daily Jennifer Sood MD      cefepime  2,000 mg Intravenous Q12H DENNYS Vilchis 2,000 mg (07/02/24 1230)    colchicine  0.6 mg Oral BID Jennifer Sood MD      docusate sodium  100 mg Oral BID Jennifer Sood MD      enoxaparin  40 mg Subcutaneous Q24H MATTEO Jennifer Sood MD      labetalol  10 mg Intravenous Q6H PRN Jennifer Sood MD      lidocaine  1 patch Topical Daily Jennifer Sood MD      losartan  25 mg Oral Daily Jennifer Sood MD      metoprolol succinate  25 mg Oral BID Jennifer Sood MD      nitroglycerin  0.4 mg Sublingual Q5 Min PRN Jennifer Sood MD      ondansetron  4 mg Intravenous Q6H PRN Jennifer Sood MD      polyethylene glycol  17 g Oral Daily Jennifer Sood MD      potassium-sodium phosphateS  2 tablet Oral 4x Daily (with meals and at bedtime) DENNYS Vilchis      sodium chloride (PF)  3 mL Intravenous Q1H PRN Jennifer Sood MD      ticagrelor  90 mg Oral Q12H  "[facility] Jennifer Sood MD              Vitals:    07/02/24 0431 07/02/24 0535 07/02/24 0743 07/02/24 1137   BP: 145/100  117/84 111/77   BP Location: Right arm      Pulse: 101  98 104   Resp:   16 16   Temp:   98.5 °F (36.9 °C) 99.6 °F (37.6 °C)   TempSrc:       SpO2: 94%  93% 95%   Weight:  70.7 kg (155 lb 13.8 oz)     Height:         Body mass index is 22.36 kg/m².      Intake/Output Summary (Last 24 hours) at 7/2/2024 1249  Last data filed at 7/2/2024 1137  Gross per 24 hour   Intake 120 ml   Output 1500 ml   Net -1380 ml       Weight change: -4.493 kg (-9 lb 14.5 oz)    PHYSICAL EXAMINATION:  Gen: Awake, Alert, NAD  Head/eyes: AT/NC, pupils equal and round, Anicteric  ENT: mmm  Neck: Supple, No elevated JVP, trachea midline  Resp: CTA bilaterally no w/r/r  CV: RRR +S1, S2, No m/r/g  Abd: Soft, NT/ND + BS  Ext: no LE edema bilaterally  Neuro: Follows commands, moves all extermities  Psych: Appropriate affect, pleasant mood, pleasant attitude, non-combative  Skin: warm; no rash, erythema or venous stasis changes on exposed skin    Lab Results:  Results from last 7 days   Lab Units 07/02/24  0402   WBC Thousand/uL 22.21*   HEMOGLOBIN g/dL 17.6*   HEMATOCRIT % 51.6*   PLATELETS Thousands/uL 308     Results from last 7 days   Lab Units 07/02/24  0402   POTASSIUM mmol/L 4.5   CHLORIDE mmol/L 102   CO2 mmol/L 19*   BUN mg/dL 15   CREATININE mg/dL 0.86   CALCIUM mg/dL 9.0     No results found for: \"TROPONINT\"      Results from last 7 days   Lab Units 07/01/24  0437 06/30/24  1641 06/30/24  1446   HS TNI 0HR ng/L  --   --  111*   HS TNI 2HR ng/L  --  >22,973*  --    HS TNI RAND ng/L >22,973*  --   --      Results from last 7 days   Lab Units 06/30/24  1446   INR  1.03       Tele: SR w/ NSVT 3 beats    This note was completed in part utilizing [employer] Software.  Grammatical errors, random word insertions, spelling mistakes, and incomplete sentences may be an occasional consequence of this system secondary to " software limitations, ambient noise, and hardware issues.  If you have any questions or concerns about the content, text, or information contained within the body of this dictation, please contact the provider for clarification.

## 2024-07-02 NOTE — PHYSICAL THERAPY NOTE
Physical Therapy Screen    Patient Name: Nolan Escobar    Today's Date: 7/2/2024     Problem List  Principal Problem:    Acute ST elevation myocardial infarction (STEMI) involving left anterior descending (LAD) coronary artery (HCC)  Active Problems:    Dyslipidemia    Hypokalemia    Hypertension       Past Medical History  No past medical history on file.     Past Surgical History  Past Surgical History:   Procedure Laterality Date    CARDIAC CATHETERIZATION N/A 6/30/2024    Procedure: Cardiac pci;  Surgeon: Jovany Wise MD;  Location: [facility] CARDIAC [facility]ATH [facility];  Service: Cardiology    CARDIAC CATHETERIZATION N/A 6/30/2024    Procedure: Cardiac Coronary Angiogram;  Surgeon: Jovany Wise MD;  Location: [facility];  Service: Cardiology    CARDIAC CATHETERIZATION N/A 6/30/2024    Procedure: Cardiac IVUS;  Surgeon: Jovany Wise MD;  Location: [facility];  Service: Cardiology    CARDIAC CATHETERIZATION Left 6/30/2024    Procedure: Cardiac Left Heart Cath;  Surgeon: Jovany Wise MD;  Location: [facility];  Service: Cardiology         07/02/24 1029   PT Last Visit   PT Visit Date 07/02/24   Note Type   Note type Screen   Additional Comments PT Consult received and chart reviewed. Spoke with DIANNE Martinez, pt currently independent for functional mobility, ambulating w/in unit w/ no c/f stairs at this time. No acute PT needs at this time. Will d/c consult. Please re-consult if change in medical status.     Sonny Medeiros  07/02/24

## 2024-07-02 NOTE — ASSESSMENT & PLAN NOTE
EKG with ST elevation in the anterior leads, cardiac catheterization with 100% proximal LAD occlusion  Cardiac stent deployed, patient initially in the ICU on nitroglycerin drip  Continue dual antiplatelet therapy x 1 year  Due to lack of insurance patient will be transition to Plavix with Plavix load 300 mg in the morning and 75 mg thereafter  Discussed with patient, unfortunately Brilinta is unaffordable  Continue statin  Continue beta-blocker  Patient will benefit from cardiac rehab at discharge    Post catherization, The left ventricular ejection fraction is 31% by biplane measurement.   Systolic function is moderately reduced  Diastolic function is mildly abnormal, consistent with grade I (abnormal) relaxation.

## 2024-07-02 NOTE — SEPSIS NOTE
"  Sepsis Note   Nolan Chito Escobar 64 y.o. male MRN: [id number]  Unit/Bed#: [address] 205-01 Encounter: [id number]    Patient found to have hyponatremia and lactic acidosis, gentle saline started for 1L  Lactate trended down 2.1-->1.7 with IVF  Tmax 101F, .4F  HR improving, now at 100BMP  Procal only 0.31, however leukocytosis increased to 22K         Initial Sepsis Screening       Row Name 07/02/24 0104                Is the patient's history suggestive of a new or worsening infection? Yes (Proceed)  -JS        Suspected source of infection suspect infection, source unknown  -JS        Indicate SIRS criteria Hyperthemia > 38.3C (100.9F) OR Hypothermia <36C (96.8F);Tachycardia > 90 bpm;Leukocytosis (WBC > 50743 IJL) OR Leukopenia (WBC <4000 IJL) OR Bandemia (WBC >10% bands)  -JS        Are two or more of the above signs & symptoms of infection both present and new to the patient? --        Assess for evidence of organ dysfunction: Are any of the below criteria present within 6 hours of suspected infection and SIRS criteria that are NOT considered to be chronic conditions? --                  User Key  (r) = Recorded By, (t) = Taken By, (c) = Cosigned By      Initials Name Provider Type    DENNYS Cannon Nurse Practitioner                    Default Flowsheet Data (Last 720 Hours)       Sepsis Reassess       Row Name 07/02/24 0408                   Repeat Volume Status and Tissue Perfusion Assessment Performed    Date of Reassessment: 07/02/24  -        Time of Reassessment: 0408  -        Sepsis Reassessment Note: Click \"NEXT\" below (NOT \"close\") to generate sepsis reassessment note. YES (proceed by clicking \"NEXT\")  -JS        Repeat Volume Status and Tissue Perfusion Assessment Performed --                  User Key  (r) = Recorded By, (t) = Taken By, (c) = Cosigned By      Initials Name Provider Type    DENNYS Cannon Nurse Practitioner                    Body " mass index is 22.24 kg/m².  Wt Readings from Last 1 Encounters:   07/01/24 70.3 kg (155 lb)     IBW (Ideal Body Weight): 73 kg    Ideal body weight: 73 kg (160 lb 15 oz)

## 2024-07-02 NOTE — SEPSIS NOTE
Sepsis Note   Nolan Escobar 64 y.o. male MRN: [id number]  Unit/Bed#: [place] [address] -01 Encounter: [id number]    Tmax 101, Leukocytosis 15,   Check CXR, Lactate, BMP/CBC, UA, blood cultures  Start Cefepime/Vanco  Hold of on crystalloid resuscitation due to recent MI and concern for fluid overload. BP stable currently.          Initial Sepsis Screening   Row Name  07/02/24 0104  Is the patient's history suggestive of a new or worsening infection?  Yes (Proceed)  -JS  Suspected source of infection  suspect infection, source unknown  -JS  Indicate SIRS criteria  Hyperthemia > 38.3C (100.9F) OR Hypothermia <36C (96.8F);Tachycardia > 90 bpm;Leukocytosis (WBC > 58919 IJL) OR Leukopenia (WBC <4000 IJL) OR Bandemia (WBC >10% bands)  -JS  Are two or more of the above signs & symptoms of infection both present and new to the patient?  --  Assess for evidence of organ dysfunction: Are any of the below criteria present within 6 hours of suspected infection and SIRS criteria that are NOT considered to be chronic conditions?  --    User Key   (r) = Recorded By, (t) = Taken By, (c) = Cosigned By  Initials  Name  Provider Type  DENNYS Cannon  Nurse Practitioner            Body mass index is 22.24 kg/m².  Wt Readings from Last 1 Encounters:  07/01/24  70.3 kg (155 lb)    IBW (Ideal Body Weight): 73 kg    Ideal body weight: 73 kg (160 lb 15 oz)

## 2024-07-03 VITALS
HEIGHT: 70 IN | DIASTOLIC BLOOD PRESSURE: 67 MMHG | SYSTOLIC BLOOD PRESSURE: 102 MMHG | RESPIRATION RATE: 16 BRPM | OXYGEN SATURATION: 93 % | HEART RATE: 94 BPM | BODY MASS INDEX: 21.37 KG/M2 | WEIGHT: 149.25 LBS | TEMPERATURE: 98.5 F

## 2024-07-03 LAB
ANION GAP SERPL CALCULATED.3IONS-SCNC: 10 MMOL/L (ref 4–13)
BUN SERPL-MCNC: 16 MG/DL (ref 5–25)
CALCIUM SERPL-MCNC: 9 MG/DL (ref 8.4–10.2)
CHLORIDE SERPL-SCNC: 101 MMOL/L (ref 96–108)
CO2 SERPL-SCNC: 24 MMOL/L (ref 21–32)
CREAT SERPL-MCNC: 1 MG/DL (ref 0.6–1.3)
ERYTHROCYTE [DISTWIDTH] IN BLOOD BY AUTOMATED COUNT: 11.5 % (ref 11.6–15.1)
GFR SERPL CREATININE-BSD FRML MDRD: 79 ML/MIN/1.73SQ M
GLUCOSE SERPL-MCNC: 124 MG/DL (ref 65–140)
HCT VFR BLD AUTO: 47.1 % (ref 36.5–49.3)
HGB BLD-MCNC: 16.5 G/DL (ref 12–17)
MCH RBC QN AUTO: 33.2 PG (ref 26.8–34.3)
MCHC RBC AUTO-ENTMCNC: 35 G/DL (ref 31.4–37.4)
MCV RBC AUTO: 95 FL (ref 82–98)
PLATELET # BLD AUTO: 268 THOUSANDS/UL (ref 149–390)
PMV BLD AUTO: 10.6 FL (ref 8.9–12.7)
POTASSIUM SERPL-SCNC: 3.6 MMOL/L (ref 3.5–5.3)
RBC # BLD AUTO: 4.97 MILLION/UL (ref 3.88–5.62)
SODIUM SERPL-SCNC: 135 MMOL/L (ref 135–147)
WBC # BLD AUTO: 12.65 THOUSAND/UL (ref 4.31–10.16)

## 2024-07-03 PROCEDURE — 85027 COMPLETE CBC AUTOMATED: CPT | Performed by: INTERNAL MEDICINE

## 2024-07-03 PROCEDURE — 99239 HOSP IP/OBS DSCHRG MGMT >30: CPT | Performed by: INTERNAL MEDICINE

## 2024-07-03 PROCEDURE — 80048 BASIC METABOLIC PNL TOTAL CA: CPT | Performed by: INTERNAL MEDICINE

## 2024-07-03 PROCEDURE — 99232 SBSQ HOSP IP/OBS MODERATE 35: CPT

## 2024-07-03 RX ADMIN — CLOPIDOGREL BISULFATE 300 MG: 75 TABLET ORAL at 09:24

## 2024-07-03 RX ADMIN — METOPROLOL SUCCINATE 25 MG: 25 TABLET, FILM COATED, EXTENDED RELEASE ORAL at 09:24

## 2024-07-03 RX ADMIN — ATORVASTATIN CALCIUM 80 MG: 80 TABLET, FILM COATED ORAL at 09:24

## 2024-07-03 RX ADMIN — LOSARTAN POTASSIUM 25 MG: 25 TABLET, FILM COATED ORAL at 09:24

## 2024-07-03 RX ADMIN — ASPIRIN 81 MG: 81 TABLET, COATED ORAL at 09:24

## 2024-07-03 RX ADMIN — ENOXAPARIN SODIUM 40 MG: 40 INJECTION SUBCUTANEOUS at 09:24

## 2024-07-03 RX ADMIN — COLCHICINE 0.6 MG: 0.6 TABLET ORAL at 09:24

## 2024-07-03 RX ADMIN — AMLODIPINE BESYLATE 5 MG: 5 TABLET ORAL at 09:24

## 2024-07-03 NOTE — NURSING NOTE
Discussed discharge instructions with patient and family member. LEILA George assisted with Hong [place]er translation for discharge instructions. Patient and family verbalized understanding of discharge instructions. Patient provided with MI education packet in Hong Konger and zone tool. IV removed x2. Patient provided a work excuse note. Patient left with all belongings.

## 2024-07-03 NOTE — DISCHARGE INSTR - AVS FIRST PAGE
Dear Nolan Escobar,     It was our pleasure to care for you here at [facility].  It is our hope that we were always able to exceed the expected standards for your care during your stay.  You were hospitalized due to heart attack.  You were cared for on the second floor by Sly Alston, DO with the [facility] who covers for your primary care physician (PCP), No primary care provider on file., while you were hospitalized.  If you have any questions or concerns related to this hospitalization, you may contact us at .  For follow up as well as any medication refills, we recommend that you follow up with your primary care physician.  A registered nurse will reach out to you by phone within a few days after your discharge to answer any additional questions that you may have after going home.  However, at this time we provide for you here, the most important instructions / recommendations at discharge:     Notable Medication Adjustments -   Plavix 75 mg once daily-you run out of your medication this is a medical emergency, please go to the emergency room or contact your cardiologist for refill  Aspirin 81 mg once daily  Colchicine 0.6 mg twice a day for 1 month  Atorvastatin which is a medication for your cholesterol  Losartan 25 mg which is a medication for your blood pressure  Amlodipine 5 mg which is a medication for your blood pressure  Metoprolol which is a medication for your blood pressure and your heart  Testing Required after Discharge -   Follow-up with cardiology in 2 weeks  Important follow up information -   PCP follow-up in 1 week-has been made to the [facility]  Referral to cardiac rehabilitation  Other Instructions -   No air travel for 1 week  Please review this entire after visit summary as additional general instructions including medication list, appointments, activity, diet, any pertinent wound care, and other  additional recommendations from your care team that may be provided for you.      Sincerely,     Sly Alston DO and Nurse Stefany Grey

## 2024-07-03 NOTE — ASSESSMENT & PLAN NOTE
EKG with ST elevation in the anterior leads, cardiac catheterization with 100% proximal LAD occlusion  Cardiac stent deployed, patient initially in the ICU on nitroglycerin drip  Continue dual antiplatelet therapy x 1 year  Due to lack of insurance patient will be transition to Plavix with Plavix load 300 mg in the morning and 75 mg thereafter  Discussed with patient, unfortunately Brilinta is unaffordable  Continue statin  Continue beta-blocker  Patient will benefit from cardiac rehab at discharge    Post catherization, The left ventricular ejection fraction is 31% by biplane measurement.   Systolic function is moderately reduced  Diastolic function is mildly abnormal, consistent with grade I (abnormal) relaxation.   Will need repeat echocardiogram as an outpatient

## 2024-07-03 NOTE — DISCHARGE SUMMARY
[facility]  Discharge- Nolan Escobar 1960, 64 y.o. male MRN: [id number]  Unit/Bed#: [place] [address] -01 Encounter: [id number]  Primary Care Provider: No primary care provider on file.   Date and time admitted to hospital: 6/30/2024  2:36 PM    Admitting Provider:  Tonny Curiel MD  Discharge Provider:  Sly Alston DO  Admission Date: 6/30/2024       Discharge Date: 07/03/24   LOS: 3  Primary Care Physician at Discharge: No primary care provider on file. None    HOSPITAL COURSE:  Nolan Escobar is a 64 y.o. male who presented chest pain.  The patient was found to have anterior wall ST segment elevation myocardial infarction.  He was urgently taken to the cardiac catheterization lab where he was found to have 100% occlusion of the proximal LAD.     Patient was evaluated in consultation by the cardiology service, drug-eluting stent was placed on June 30, 2024.  Patient was found to have a new reduced ejection fraction of 31% which was felt to be secondary to ischemic cardiomyopathy.  He was also found to have accelerated hypertension and dyslipidemia.    The patient initially was started on Brilinta however due to lack of insurance he was transition to Plavix.  He was also found to have pericarditis post MI and was started on colchicine 0.6 mg twice daily.    Patient remained clinically stable on the general medical floor, he was initiated on losartan, metoprolol, amlodipine.  He was referred for cardiac rehabilitation.    Medications were provided by the hospital and he was given a 90-day supply.    Patient was counseled extensively on appropriate medication compliance as well as ensuring that he takes his Plavix and aspirin to prevent stent stenosis.      At the time of discharge the patient was tolerating oral diet they were without acute complaint and they were medically cleared for discharge.  All questions were answered the patient's  satisfaction and they were in agreement with the discharge plan.    DISCHARGE DIAGNOSES  * Acute ST elevation myocardial infarction (STEMI) involving left anterior descending (LAD) coronary artery (HCC)  Assessment & Plan  EKG with ST elevation in the anterior leads, cardiac catheterization with 100% proximal LAD occlusion  Cardiac stent deployed, patient initially in the ICU on nitroglycerin drip  Continue dual antiplatelet therapy x 1 year  Due to lack of insurance patient will be transition to Plavix with Plavix load 300 mg in the morning and 75 mg thereafter  Discussed with patient, unfortunately Brilinta is unaffordable  Continue statin  Continue beta-blocker  Patient will benefit from cardiac rehab at discharge    Post catherization, The left ventricular ejection fraction is 31% by biplane measurement.   Systolic function is moderately reduced  Diastolic function is mildly abnormal, consistent with grade I (abnormal) relaxation.   Will need repeat echocardiogram as an outpatient      Pericarditis  Assessment & Plan  Continue colchicine  Start Protonix given daily NSAID use      Hypertension  Assessment & Plan  Continue amlodopine, losartan, Metoprolol      Dyslipidemia  Assessment & Plan  Continue Lipitor 80 mg  LDL: 147        CONSULTING PROVIDERS   IP CONSULT TO CARDIOLOGY  IP CONSULT TO CASE MANAGEMENT    PROCEDURES PERFORMED  Procedure(s) (LRB):  Cardiac pci (N/A)  Cardiac Coronary Angiogram (N/A)  Cardiac IVUS (N/A)  Cardiac Left Heart Cath (Left)    RADIOLOGY RESULTS  XR chest portable    Result Date: 7/3/2024  Impression: No acute cardiopulmonary disease. Workstation performed: [id number]     XR chest 1 view portable    Result Date: 7/1/2024  Impression: No acute cardiopulmonary disease. Workstation performed: [id number]       LABS  Results from last 7 days   Lab Units 07/03/24  0435 07/02/24  0402 07/02/24  0104 07/01/24  0437 06/30/24  1446   WBC Thousand/uL 12.65* 22.21* 20.69* 15.12* 7.89    HEMOGLOBIN g/dL 16.5 17.6* 16.8 16.4 16.0   HEMATOCRIT % 47.1 51.6* 47.9 48.4 46.9   MCV fL 95 97 93 96 97   PLATELETS Thousands/uL 268 308 322 356 329   INR   --   --   --   --  1.03     Results from last 7 days   Lab Units 07/03/24  0435 07/02/24  0402 07/02/24  0104 07/01/24  1632 07/01/24  0437 06/30/24  1446   SODIUM mmol/L 135 130* 130* 135 139 140   POTASSIUM mmol/L 3.6 4.5 4.4 4.9 3.0* 3.2*   CHLORIDE mmol/L 101 102 99 99 114* 104   CO2 mmol/L 24 19* 24 28 18* 28   BUN mg/dL 16 15 15 16 11 10   CREATININE mg/dL 1.00 0.86 0.86 0.83 0.62 1.12   CALCIUM mg/dL 9.0 9.0 9.5 10.6* 6.4* 9.8   EGFR ml/min/1.73sq m 79 91 91 92 104 69   GLUCOSE RANDOM mg/dL 124 130 156* 129 129 132     Results from last 7 days   Lab Units 06/30/24  1641 06/30/24  1446   HS TNI 0HR ng/L  --  111*   HS TNI 2HR ng/L >22,973*  --                   Results from last 7 days   Lab Units 07/01/24  1632   HEMOGLOBIN A1C % 5.8*         Results from last 7 days   Lab Units 07/02/24  0337 07/02/24  0110 07/02/24  0104   LACTIC ACID mmol/L 1.7 2.1*  --    PROCALCITONIN ng/ml  --   --  0.31*     Results from last 7 days   Lab Units 06/30/24  1446   TRIGLYCERIDES mg/dL 124   CHOLESTEROL mg/dL 232*   LDL CALC mg/dL 147*   HDL mg/dL 60       Cultures:   Results from last 7 days   Lab Units 07/02/24  0454   COLOR UA  Light Orange   CLARITY UA  Clear   SPEC GRAV UA  1.024   PH UA  6.0   LEUKOCYTES UA  Negative   NITRITE UA  Negative   GLUCOSE UA mg/dl Trace*   KETONES UA mg/dl Negative   BILIRUBIN UA  Negative   BLOOD UA  Negative      Results from last 7 days   Lab Units 07/02/24  0454   RBC UA /hpf 1-2   WBC UA /hpf 1-2   EPITHELIAL CELLS WET PREP /hpf None Seen   BACTERIA UA /hpf None Seen      Results from last 7 days   Lab Units 07/02/24  0110 07/02/24  0105   BLOOD CULTURE  No Growth at 24 hrs. No Growth at 24 hrs.             PHYSICAL EXAM:  Vitals:   Blood Pressure: 102/68 (07/03/24 0741)  Pulse: 87 (07/03/24 0741)  Temperature: 99.7 °F (37.6  "°C) (07/03/24 0741)  Temp Source: Oral (07/02/24 0219)  Respirations: 16 (07/03/24 0741)  Height: 5' 10\" (177.8 cm) (07/01/24 0843)  Weight - Scale: 67.7 kg (149 lb 4 oz) (07/03/24 0538)  SpO2: 94 % (07/03/24 0741)      General: well appearing, no acute distress  HEENT: atraumatic, PERRLA, moist mucosa, normal pharynx, normal tonsils and adenoids, normal tongue, no fluid in sinuses  Neck: Trachea midline, no carotid bruit, no masses  Respiratory: normal chest wall expansion, CTA B  Cardiovascular: RRR, no m/r/g, Normal S1 and S2  Abdomen: Soft, non-tender, non-distended, normal bowel sounds in all quadrants, no hepatosplenomegaly, no tympany  Rectal: deferred  Musculoskeletal: Moves all  Integumentary: warm, dry, and pink, with no visible rash, purpura, or petechia  Heme/Lymph: no lymphadenopathy, no bruises  Neurological: Cranial Nerves II-XII grossly intact  Psychiatric: cooperative with normal mood, affect, and cognition       Discharge Disposition: Home   AM-PAC Basic Mobility:  Basic Mobility Inpatient Raw Score: 24    JH-HLM Achieved: 8: Walk 250 feet ot more  JH-HLM Goal: 8: Walk 250 feet or more    HLM Goal listed above. Continue with ongoing physical therapy and encourage appropriate mobility to improve upon HLM goals.      Test Results Pending at Discharge:   Pending Labs       Order Current Status    Blood culture Preliminary result    Blood culture Preliminary result                Medications   Summary of Medication Adjustments made as a result of this hospitalization: See discharge summary and AVS for medication changes  Medication Dosing Tapers - Please refer to Discharge Medication List for details on any medication dosing tapers (if applicable to patient).  Discharge Medication List: See after visit summary for reconciled discharge medications.     Diet restrictions:         Diet Orders   (From admission, onward)                 Start     Ordered    06/30/24 3927  Diet Cardiovascular; Cardiac  Diet " effective now        References:    Adult Nutrition Support Algorithm    RD Therapeutic Diet Order Protocol   Question Answer Comment   Diet Type Cardiovascular    Cardiac Cardiac    RD to adjust diet per protocol? Yes        06/30/24 3491                  Activity restrictions: No strenuous activity  Discharge Condition: good    Outpatient Follow-Up and Discharge Instructions  See after visit summary section titled Discharge Instructions for information provided to patient and family.      Code Status: Level 1 - Full Code  Discharge Statement   I spent 86 minutes discharging the patient. This time was spent on the day of discharge. Greater than 50% of total time was spent with the patient and / or family counseling and / or coordination of care.    ** Please Note: This note was completed in part utilizing Nuance Dragon Medical One Software.  Grammatical errors, random word insertions, spelling mistakes, and incomplete sentences may be an occasional consequence of this system secondary to software limitations, ambient noise, and hardware issues.  If you have any questions or concerns about the content, text, or information contained within the body of this dictation, please contact the provider for clarification.**

## 2024-07-03 NOTE — RESTORATIVE TECHNICIAN NOTE
Restorative Technician Note      Patient Name: Nolan Escobar     Restorative Tech Visit Date: 07/03/24  Note Type: Mobility  Patient Position Upon Consult: Supine  Activity Performed: Ambulated; Range of motion  Patient Position at End of Consult: Supine; All needs within reach

## 2024-07-03 NOTE — PROGRESS NOTES
"Progress Note - Cardiology   [facility] Chito Escobar 64 y.o. male MRN: [id number]  Unit/Bed#: [place] [address] -01 Encounter: [id number]      Assessment/Recommendations/Discussion:   Anterior STEMI  s/p DOMENICO-pLAD, June 30, 2024  Ischemic cardiomyopathy  LVEF 31%, mild LVH, grade 1 diastolic dysfunction, apical dyskinesis, akinesis of the anteroseptal wall, anterior wall from mid to apex as well as the inferoapical and apical lateral walls, mild MR/TR, July 2024  Accelerated hypertension  Dyslipidemia w/ LDL 47 mg/dL, HDL 60 mg/dL,  mg/dL, June 2024  Hypokalemia    Results from last 7 days   Lab Units 07/01/24  0844   SL CV LV EF  31        PLAN  States he is feeling well today, no significant chest pain  Plan to transition to Plavix today as Brilinta unaffordable  Continue with colchicine 0.6 mg p.o. twice daily x 1 month  Continue losartan, metoprolol succinate, amlodipine, statin  Recommended for cardiac rehab on DC  Will need close outpatient follow-up with cardiology with plan to uptitrate GDMT for HFrEF and recheck echo in 3 months.  Consider adding spironolactone as outpatient  Due to insurance issues, not candidate for Jardiance, Entresto, LifeVest.  Otherwise okay for DC today    Subjective:   HPI  Doing well today, no significant chest pain or shortness of breath or lower extremity swelling      Review of Systems: As noted in HPI. Rest of ROS is negative.    Vitals:   /68   Pulse 87   Temp 99.7 °F (37.6 °C)   Resp 16   Ht 5' 10\" (1.778 m)   Wt 67.7 kg (149 lb 4 oz)   SpO2 94%   BMI 21.42 kg/m²   I/O         07/01 0701 07/02 0700 07/02 0701  07/03 0700 07/03 0701  07/04 0700    P.O. 480 480 480    I.V. (mL/kg)       Total Intake(mL/kg) 480 (6.8) 480 (7.1) 480 (7.1)    Urine (mL/kg/hr) 1700 (1) 700 (0.4)     Total Output 1700 700     Net -1220 -220 +480                 Weight (last 2 days)       Date/Time Weight    07/03/24 0538 67.7 (149.25)    07/03/24 0437 67.7 (149.25)    07/02/24 0535 " 70.7 (155.87)    07/01/24 0843 70.3 (155)            Physical Exam   Constitutional: awake, alert and oriented, in no acute distress, no obvious deformities  Head: Normocephalic, without obvious abnormality, atraumatic  Eyes: conjunctivae clear and moist. Sclera anicteric. No xanthelasmas. Pupils equal bilaterally. Extraocular motions are full.  Ear nose mouth and throat: ears are symmetrical bilaterally, hearing appears to be equal bilaterally, no nasal discharge or epistaxis, oropharynx is clear with moist mucous membranes  Neck: Trachea is midline, neck is supple, no thyromegaly or significant lymphadenopathy, there is full range of motion.  Lungs: clear to auscultation bilaterally, no wheezes, no rales, no rhonchi, no accessory muscle use, breathing is nonlabored  Heart: regular rate and rhythm, S1, S2 normal, no murmur, no click, no rub and no gallop, no lower extremity edema  Abdomen: soft, non-tender; bowel sounds normal; no masses, no organomegaly  Psychiatric: Patient is oriented to time, place, person, mood/affect is negative for depression, anxiety, agitation, appears to have appropriate insight  Skin: Skin is warm, dry, intact. No obvious rashes or lesions on exposed extremities. Nail beds are pink with no cyanosis or clubbing.    TELEMETRY:   Lab Results:  Results from last 7 days   Lab Units 07/03/24  0435   WBC Thousand/uL 12.65*   HEMOGLOBIN g/dL 16.5   HEMATOCRIT % 47.1   PLATELETS Thousands/uL 268     Results from last 7 days   Lab Units 07/03/24  0435   POTASSIUM mmol/L 3.6   CHLORIDE mmol/L 101   CO2 mmol/L 24   BUN mg/dL 16   CREATININE mg/dL 1.00   CALCIUM mg/dL 9.0     Results from last 7 days   Lab Units 07/03/24  0435   POTASSIUM mmol/L 3.6   CHLORIDE mmol/L 101   CO2 mmol/L 24   BUN mg/dL 16   CREATININE mg/dL 1.00   CALCIUM mg/dL 9.0           Medications:    Current Facility-Administered Medications:     acetaminophen (TYLENOL) tablet 975 mg, 975 mg, Oral, Q6H PRN, Jennifer Sood MD, 975 mg  "at 07/01/24 2232    aluminum-magnesium hydroxide-simethicone (MAALOX) oral suspension 30 mL, 30 mL, Oral, Q6H PRN, Jennifer Sood MD    amLODIPine (NORVASC) tablet 5 mg, 5 mg, Oral, Daily, Jennifer Sood MD, 5 mg at 07/03/24 0924    aspirin (ECOTRIN LOW STRENGTH) EC tablet 81 mg, 81 mg, Oral, Daily, Jennifer Sood MD, 81 mg at 07/03/24 0924    atorvastatin (LIPITOR) tablet 80 mg, 80 mg, Oral, Daily, Jennifer Sood MD, 80 mg at 07/03/24 0924    [START ON 7/4/2024] clopidogrel (PLAVIX) tablet 75 mg, 75 mg, Oral, Daily, Sly Alston DO    colchicine (COLCRYS) tablet 0.6 mg, 0.6 mg, Oral, BID, Jennifer Sood MD, 0.6 mg at 07/03/24 0924    docusate sodium (COLACE) capsule 100 mg, 100 mg, Oral, BID, Jennifer Sood MD, 100 mg at 07/02/24 1610    enoxaparin (LOVENOX) subcutaneous injection 40 mg, 40 mg, Subcutaneous, Q24H MATTEO, Jennifer Sood MD, 40 mg at 07/03/24 0924    labetalol (NORMODYNE) injection 10 mg, 10 mg, Intravenous, Q6H PRN, Jennifer Sood MD, 10 mg at 06/30/24 2317    lidocaine (LIDODERM) 5 % patch 1 patch, 1 patch, Topical, Daily, Jennifer Sood MD, 1 patch at 07/01/24 0847    losartan (COZAAR) tablet 25 mg, 25 mg, Oral, Daily, Jennifer Sood MD, 25 mg at 07/03/24 0924    metoprolol succinate (TOPROL-XL) 24 hr tablet 25 mg, 25 mg, Oral, BID, Jennifer Sood MD, 25 mg at 07/03/24 0924    nitroglycerin (NITROSTAT) SL tablet 0.4 mg, 0.4 mg, Sublingual, Q5 Min PRN, Jennifer Sood MD, 0.4 mg at 07/01/24 0740    ondansetron (ZOFRAN) injection 4 mg, 4 mg, Intravenous, Q6H PRN, Jennifer Sood MD    polyethylene glycol (MIRALAX) packet 17 g, 17 g, Oral, Daily, Jennifer Sood MD, 17 g at 07/02/24 0800    Insert peripheral IV, , , Once **AND** sodium chloride (PF) 0.9 % injection 3 mL, 3 mL, Intravenous, Q1H PRN, Jennifer Sood MD    Portions of the record may have been created with voice recognition software. Occasional wrong word or \"sound a like\" substitutions may have occurred due to the inherent limitations of voice recognition software. " Read the chart carefully and recognize, using context, where substitutions have occurred.    Arnaldo Carlin DO, [facility], [facility]  7/3/2024 9:32 AM

## 2024-07-07 LAB
BACTERIA BLD CULT: NORMAL
BACTERIA BLD CULT: NORMAL

## 2024-07-08 NOTE — PROGRESS NOTES
Transition of Care Visit  Name: Nolan Escobar      : 1960      MRN: [id number]  Encounter Provider: Lay Adler MD  Encounter Date: 2024   Encounter department: [facility]    Assessment & Plan   1. Acute ST elevation myocardial infarction (STEMI) involving left anterior descending (LAD) coronary artery (HCC)  Assessment & Plan:  Patient was admitted to [facility] for acute STEMI complicated with pericarditis.  Had a cardiac cath with stenting. patient was discharged on 7/3/24.  Since then denies shortness of breath or chest pain.  Followed by cardiology.  Medications reconciled but patient does not know his  meds.  Patient will come back by next week with his meds and for review.  2. Acute pericarditis associated with other disease  Assessment & Plan:  Denies chest pain and remains on colchicine.  Sees cardiology on 24.  3. Primary hypertension  Assessment & Plan:  Blood pressure controlled and meds reconciled but he does not know why he is taking each..  BP controlled. Labs reviewed from hospitalization.   Will see him back in 2 weeks and he will bring his meds  4. Dyslipidemia  Assessment & Plan:  Taking atorvastatin 80 mg daily and had has no muscles complaints.         History of Present Illness     Transitional Care Management Review:   Nolan Escobar is a 64 y.o. male here for pos hospital visit.      Review of Systems   Constitutional:  Negative for chills and fever.   HENT:  Negative for ear pain and sore throat.    Eyes:  Negative for pain and visual disturbance.   Respiratory:  Negative for cough and shortness of breath.    Cardiovascular:  Negative for chest pain, palpitations and leg swelling.   Gastrointestinal:  Negative for abdominal pain and vomiting.   Genitourinary:  Negative for dysuria and hematuria.   Musculoskeletal:  Negative for arthralgias and back pain.   Skin:  Negative for color change and rash.  "  Neurological:  Negative for seizures and syncope.   All other systems reviewed and are negative.    Objective     /71 (BP Location: Right arm, Patient Position: Sitting, Cuff Size: Standard)   Pulse 71   Temp 98 °F (36.7 °C) (Temporal)   Resp 16   Ht 5' 10\" (1.778 m)   Wt 70.3 kg (155 lb)   SpO2 99%   BMI 22.24 kg/m²     Physical Exam  Vitals and nursing note reviewed.   Constitutional:       General: He is not in acute distress.     Appearance: He is well-developed.   HENT:      Head: Normocephalic and atraumatic.   Eyes:      Conjunctiva/sclera: Conjunctivae normal.   Cardiovascular:      Rate and Rhythm: Normal rate and regular rhythm.      Pulses: Normal pulses.      Heart sounds: No murmur heard.  Pulmonary:      Effort: Pulmonary effort is normal. No respiratory distress.      Breath sounds: Normal breath sounds.   Abdominal:      Palpations: Abdomen is soft.      Tenderness: There is no abdominal tenderness.   Musculoskeletal:         General: No swelling.      Cervical back: Neck supple.   Skin:     General: Skin is warm and dry.      Capillary Refill: Capillary refill takes less than 2 seconds.   Neurological:      Mental Status: He is alert.   Psychiatric:         Mood and Affect: Mood normal.       Medications have been reviewed by provider in current encounter    Administrative Statements     "

## 2024-07-09 ENCOUNTER — OFFICE VISIT (OUTPATIENT)
Dept: FAMILY MEDICINE CLINIC | Facility: CLINIC | Age: 64
End: 2024-07-09

## 2024-07-09 VITALS
HEIGHT: 70 IN | SYSTOLIC BLOOD PRESSURE: 107 MMHG | WEIGHT: 155 LBS | DIASTOLIC BLOOD PRESSURE: 71 MMHG | RESPIRATION RATE: 16 BRPM | BODY MASS INDEX: 22.19 KG/M2 | OXYGEN SATURATION: 99 % | TEMPERATURE: 98 F | HEART RATE: 71 BPM

## 2024-07-09 DIAGNOSIS — Z11.4 SCREENING FOR HUMAN IMMUNODEFICIENCY VIRUS: ICD-10-CM

## 2024-07-09 DIAGNOSIS — Z53.20 SCREENING FOR HEPATITIS C DECLINED: ICD-10-CM

## 2024-07-09 DIAGNOSIS — E78.5 DYSLIPIDEMIA: ICD-10-CM

## 2024-07-09 DIAGNOSIS — I30.9 ACUTE PERICARDITIS ASSOCIATED WITH OTHER DISEASE: ICD-10-CM

## 2024-07-09 DIAGNOSIS — I10 PRIMARY HYPERTENSION: ICD-10-CM

## 2024-07-09 DIAGNOSIS — I21.02 ACUTE ST ELEVATION MYOCARDIAL INFARCTION (STEMI) INVOLVING LEFT ANTERIOR DESCENDING (LAD) CORONARY ARTERY (HCC): Primary | ICD-10-CM

## 2024-07-09 PROCEDURE — 99204 OFFICE O/P NEW MOD 45 MIN: CPT | Performed by: FAMILY MEDICINE

## 2024-07-09 NOTE — ASSESSMENT & PLAN NOTE
Patient was admitted to [facility] for acute STEMI complicated with pericarditis.  Had a cardiac cath with stenting. patient was discharged on 7/3/24.  Since then denies shortness of breath or chest pain.  Followed by cardiology.  Medications reconciled but patient does not know his  meds.  Patient will come back by next week with his meds and for review.

## 2024-07-09 NOTE — ASSESSMENT & PLAN NOTE
Blood pressure controlled and meds reconciled but he does not know why he is taking each..  BP controlled. Labs reviewed from hospitalization.   Will see him back in 2 weeks and he will bring his meds

## 2024-07-15 NOTE — PROGRESS NOTES
Cardiology Follow Up    Nolan Escobar  1960  [id number]  [facility] [place]  [address]  [place] [id number] [phone]    1. S/P drug eluting coronary stent placement  metoprolol succinate (TOPROL-XL) 25 mg 24 hr tablet    losartan (COZAAR) 25 mg tablet    colchicine (COLCRYS) 0.6 mg tablet    clopidogrel (PLAVIX) 75 mg tablet    aspirin (ECOTRIN LOW STRENGTH) 81 mg EC tablet    DISCONTINUED: colchicine (COLCRYS) 0.6 mg tablet      2. Ischemic cardiomyopathy  Echo follow up/limited w/ contrast if indicated      3. Pericarditis, unspecified chronicity, unspecified type        4. Hypertension, unspecified type        5. Dyslipidemia  atorvastatin (LIPITOR) 20 mg tablet          Interval History:   Mr Nolan Escobar was admitted to [facility] on 6/30 - 7/03/24 with Acute AWSTEMI.  He presented to the emergency room with chest pain.  He showed anterior wall ST segment elevation. 6/30/24 emergent cardiac catheterization which showed 100% stenosis in the proximal LAD, sp  [employer] frontier drug-eluting stent x 2, 0% residual stenosis.  TTE: Left ventricle cavity size normal  LVEF 31%, systolic to mildly reduced.  Diastolic function mildly abnormal consistent with grade 1 abnormal relaxation.  This can apex akinetic mid anterior septal apical anterior apical septal apical inferior and apical lateral walls.  Hypokinetic basal anteroseptal and mid anterior mid inferior and mid inferior lateral walls.  All other segments normal.  RV size systolic function normal.  No significant valvular disease.  Pericardium normal in appearance no pericardial effusion.  6/30/24 C2 32  HDL 60 , hemoglobin A1c 5.8 initially started on Brilinta but due to lack of insurance started on Plavix.  Post MI pericarditis and started on colchicine 0.6 mg twice daily.  Due to lack of insurance he was not a candidate for  Jardiance Entresto or LifeVestAniya Cancino presents to our office for a follow up visit.  He is a accompanied  by a family member who is interpreting for him.  He denies dyspnea with minimal moderate exertion chest pain palpitation lightheadedness or dizziness.  Taking all his medications as prescribed.  Requesting disability.  He was instructed to bring disability paperwork into the office.    Medical History  Primary Cardiologist  Primary Language Armenian  Hypertension  Dyslipidemia 630/24 , , HDL 60,     Patient Active Problem List   Diagnosis    Acute ST elevation myocardial infarction (STEMI) involving left anterior descending (LAD) coronary artery (HCC)    Dyslipidemia    Hypertension    Pericarditis     No past medical history on file.  Social History     Socioeconomic History    Marital status: Single     Spouse name: Not on file    Number of children: Not on file    Years of education: Not on file    Highest education level: Not on file   Occupational History    Not on file   Tobacco Use    Smoking status: Never    Smokeless tobacco: Never   Vaping Use    Vaping status: Never Used   Substance and Sexual Activity    Alcohol use: Not Currently    Drug use: Never    Sexual activity: Not on file   Other Topics Concern    Not on file   Social History Narrative    Not on file     Social Determinants of Health     Financial Resource Strain: Low Risk  (7/9/2024)    Overall Financial Resource Strain (CARDIA)     Difficulty of Paying Living Expenses: Not hard at all   Food Insecurity: No Food Insecurity (7/1/2024)    Hunger Vital Sign     Worried About Running Out of Food in the Last Year: Never true     Ran Out of Food in the Last Year: Never true   Transportation Needs: No Transportation Needs (7/1/2024)    PRAPARE - Transportation     Lack of Transportation (Medical): No     Lack of Transportation (Non-Medical): No   Physical Activity: Not on file   Stress: Not on file   Social Connections:  Not on file   Intimate Partner Violence: Not on file   Housing Stability: Low Risk  (7/1/2024)    Housing Stability Vital Sign     Unable to Pay for Housing in the Last Year: No     Number of Times Moved in the Last Year: 0     Homeless in the Last Year: No      No family history on file.  Past Surgical History:   Procedure Laterality Date    CARDIAC CATHETERIZATION N/A 6/30/2024    Procedure: Cardiac pci;  Surgeon: Jovany Wise MD;  Location: [facility];  Service: Cardiology    CARDIAC CATHETERIZATION N/A 6/30/2024    Procedure: Cardiac Coronary Angiogram;  Surgeon: Jovany Wise MD;  Location: [facility];  Service: Cardiology    CARDIAC CATHETERIZATION N/A 6/30/2024    Procedure: Cardiac IVUS;  Surgeon: Jovany Wise MD;  Location: [facility];  Service: Cardiology    CARDIAC CATHETERIZATION Left 6/30/2024    Procedure: Cardiac Left Heart Cath;  Surgeon: Jovany Wise MD;  Location: [facility];  Service: Cardiology       Current Outpatient Medications:     amLODIPine (NORVASC) 5 mg tablet, Take 1 tablet (5 mg total) by mouth daily, Disp: 30 tablet, Rfl: 0    aspirin (ECOTRIN LOW STRENGTH) 81 mg EC tablet, Take 1 tablet (81 mg total) by mouth daily, Disp: 90 tablet, Rfl: 0    atorvastatin (LIPITOR) 80 mg tablet, Take 1 tablet (80 mg total) by mouth daily, Disp: 90 tablet, Rfl: 0    clopidogrel (PLAVIX) 75 mg tablet, Take 1 tablet (75 mg total) by mouth daily Do not start before July 4, 2024., Disp: 90 tablet, Rfl: 0    colchicine (COLCRYS) 0.6 mg tablet, Take 1 tablet (0.6 mg total) by mouth 2 (two) times a day, Disp: 60 tablet, Rfl: 0    losartan (COZAAR) 25 mg tablet, Take 1 tablet (25 mg total) by mouth daily, Disp: 90 tablet, Rfl: 0    metoprolol succinate (TOPROL-XL) 25 mg 24 hr tablet, Take 1 tablet (25 mg total) by mouth 2 (two) times a day, Disp: 180 tablet, Rfl: 0    pantoprazole (PROTONIX) 40 mg tablet, Take 1 tablet (40 mg total) by mouth daily, Disp: 90 tablet, Rfl: 0  No  Known Allergies    Labs:  No results displayed because visit has over 200 results.        Imaging: XR chest portable    Result Date: 7/3/2024  Narrative: XR CHEST PORTABLE INDICATION: rule out PNA. COMPARISON: None FINDINGS: Clear lungs. No pneumothorax or pleural effusion. Normal cardiomediastinal silhouette. Bones are unremarkable for age. Normal upper abdomen.     Impression: No acute cardiopulmonary disease. Workstation performed: [id number]     Echo complete w/ contrast if indicated    Result Date: 7/1/2024  Narrative:   Left Ventricle: Left ventricular cavity size is normal. Wall thickness is mildly increased. The left ventricular ejection fraction is 31% by biplane measurement. Systolic function is moderately reduced. Global longitudinal strain is reduced at -6%. Diastolic function is mildly abnormal, consistent with grade I (abnormal) relaxation.   The following segments are dyskinetic: apex.   The following segments are akinetic: mid anteroseptal, apical anterior, apical septal, apical inferior and apical lateral.   The following segments are hypokinetic: basal anteroseptal, mid anterior, mid inferior and mid inferolateral.   All other segments are normal.   Mitral Valve: There is mild regurgitation.   Tricuspid Valve: There is mild regurgitation. There is severe mid to apical hypokinesis/akinesis with dyskinesis of the true apex itself.  This is consistent with large LAD territory infarct.     XR chest 1 view portable    Result Date: 7/1/2024  Narrative: XR CHEST PORTABLE INDICATION: MI. COMPARISON: None FINDINGS: Clear lungs. No pneumothorax or pleural effusion. Normal cardiomediastinal silhouette. Bones are unremarkable for age. Normal upper abdomen.     Impression: No acute cardiopulmonary disease. Workstation performed: [id number]     Cardiac catheterization    Result Date: 6/30/2024  Narrative:   Prox LAD lesion is 100% stenosed.   The angioplasty was pre-stent angioplasty. Single-vessel coronary  artery disease.  There is a 100% stenosis of the proximal LAD, representing the culprit for the patient's anterior STEMI.  Other vessels were free of apparent atherosclerosis. Successful IVUS-guided PCI, proximal LAD, with reduction in stenosis from 100% to 0% and improvement in ERIBERTO flow from 0-3 following placement of a 3.0x26mm drug-eluding stent and post-dilation of the proximal and mid stent with a 3.25mm balloon. Markedly elevated LVEDP (35 mmHg).        Review of Systems:  Review of Systems   All other systems reviewed and are negative.      Physical Exam:  Physical Exam  Vitals reviewed.   Constitutional:       Appearance: Normal appearance.   Cardiovascular:      Rate and Rhythm: Normal rate and regular rhythm.      Pulses: Normal pulses.      Heart sounds: Normal heart sounds.   Pulmonary:      Effort: Pulmonary effort is normal.      Breath sounds: Normal breath sounds.   Musculoskeletal:         General: Normal range of motion.      Cervical back: Normal range of motion and neck supple.      Right lower leg: No edema.      Left lower leg: No edema.   Skin:     General: Skin is warm and dry.      Capillary Refill: Capillary refill takes less than 2 seconds.      Comments: Right radial cath site appears healed   Neurological:      General: No focal deficit present.      Mental Status: He is alert and oriented to person, place, and time.   Psychiatric:         Mood and Affect: Mood normal.         Behavior: Behavior normal.         Discussion/Summary:  # 6/30/24 sp DOMENICO tx 2 o 100% stenosis of LAD.   100% stenosis in the proximal LAD, sp  [employer] frontier drug-eluting stent x 2, 0% residual stenosis.  Continue on aspirin 81 mg daily, Plavix 75 mg daily.  He is aware not to stop for any reason we will continue for 1 year.  Continue on losartan 25 mg daily, metoprolol succinate 25 mg every 12 hours, decrease Lipitor from 80 mg daily to 20 mg daily due to colchicine and interaction.  Heart healthy diet follow-up  cardiac rehabilitation in the near future.  # ICM LVEF 31% NYHA Class II stage B-patient does not have health insurance and is not a candidate for a LifeVest, Entresto or Jardiance.  Continue on losartan 25 mg daily, metoprolol succinate 25 mg twice daily follow-up limited TTE in 2 to 3 months evaluate LVEF.  No symptoms of heart failure.  [place] heart failure education provided for self-care management and when to call our office  # Post MI Pericarditis decrease colchicine to 0.6 mg daily.  # Hypertension LUE sitting 98/60 stop amlodipine continue metoprolol succinate 25 mg every 12 hours, losartan 25 mg daily, DASH diet   # Dyslipidemia 630/24 , , HDL 60,  decrease Lipitor from 80 mg daily to 20 mg daily due to interaction between colchicine and Lipitor.  Once close routine stop increase Lipitor to 80 mg daily, heart healthy diet

## 2024-07-16 ENCOUNTER — OFFICE VISIT (OUTPATIENT)
Dept: CARDIOLOGY CLINIC | Facility: CLINIC | Age: 64
End: 2024-07-16

## 2024-07-16 VITALS
DIASTOLIC BLOOD PRESSURE: 68 MMHG | BODY MASS INDEX: 22.32 KG/M2 | WEIGHT: 155.9 LBS | HEIGHT: 70 IN | HEART RATE: 79 BPM | OXYGEN SATURATION: 98 % | SYSTOLIC BLOOD PRESSURE: 102 MMHG

## 2024-07-16 DIAGNOSIS — I25.5 ISCHEMIC CARDIOMYOPATHY: ICD-10-CM

## 2024-07-16 DIAGNOSIS — I10 HYPERTENSION, UNSPECIFIED TYPE: ICD-10-CM

## 2024-07-16 DIAGNOSIS — Z95.5 S/P DRUG ELUTING CORONARY STENT PLACEMENT: Primary | ICD-10-CM

## 2024-07-16 DIAGNOSIS — E78.5 DYSLIPIDEMIA: ICD-10-CM

## 2024-07-16 DIAGNOSIS — I31.9 PERICARDITIS, UNSPECIFIED CHRONICITY, UNSPECIFIED TYPE: ICD-10-CM

## 2024-07-16 PROCEDURE — 99215 OFFICE O/P EST HI 40 MIN: CPT | Performed by: NURSE PRACTITIONER

## 2024-07-16 RX ORDER — CLOPIDOGREL BISULFATE 75 MG/1
75 TABLET ORAL DAILY
Qty: 90 TABLET | Refills: 4 | Status: SHIPPED | OUTPATIENT
Start: 2024-07-16

## 2024-07-16 RX ORDER — COLCHICINE 0.6 MG/1
0.6 TABLET ORAL DAILY
Qty: 30 TABLET | Refills: 0 | Status: SHIPPED | OUTPATIENT
Start: 2024-07-16 | End: 2024-07-16 | Stop reason: SDUPTHER

## 2024-07-16 RX ORDER — LOSARTAN POTASSIUM 25 MG/1
25 TABLET ORAL DAILY
Qty: 90 TABLET | Refills: 4 | Status: SHIPPED | OUTPATIENT
Start: 2024-07-16 | End: 2024-10-14

## 2024-07-16 RX ORDER — METOPROLOL SUCCINATE 25 MG/1
25 TABLET, EXTENDED RELEASE ORAL 2 TIMES DAILY
Qty: 180 TABLET | Refills: 3 | Status: SHIPPED | OUTPATIENT
Start: 2024-07-16 | End: 2024-10-14

## 2024-07-16 RX ORDER — COLCHICINE 0.6 MG/1
0.6 TABLET ORAL DAILY
Qty: 30 TABLET | Refills: 0 | Status: SHIPPED | OUTPATIENT
Start: 2024-07-16 | End: 2024-08-15

## 2024-07-16 RX ORDER — ATORVASTATIN CALCIUM 20 MG/1
20 TABLET, FILM COATED ORAL DAILY
Qty: 60 TABLET | Refills: 0 | Status: SHIPPED | OUTPATIENT
Start: 2024-07-16 | End: 2024-07-23 | Stop reason: SDUPTHER

## 2024-07-16 NOTE — PATIENT INSTRUCTIONS
Colchicine 0.6mg daily   Lipitor 20mg daily then increase once off Colchicine   Stop Amlodipine   Do not stop Aspirin or Plavix for any reason continue for one yaer   Follow up 2 D echocardiogram     Maintain a 2 gram daily sodium diet and 2 liter daily fluid restriction.  Check daily weights.  If you gained 3 pounds in one day, 5 pounds in one week, or experience worsening shortness of breath or increasing lower leg swelling.  Please call the heart failure office at [phone].  Please bring a  list of your current medications and daily weights to the office visit

## 2024-07-23 ENCOUNTER — OFFICE VISIT (OUTPATIENT)
Dept: FAMILY MEDICINE CLINIC | Facility: CLINIC | Age: 64
End: 2024-07-23

## 2024-07-23 VITALS
SYSTOLIC BLOOD PRESSURE: 124 MMHG | DIASTOLIC BLOOD PRESSURE: 77 MMHG | HEART RATE: 74 BPM | TEMPERATURE: 98.2 F | BODY MASS INDEX: 22.19 KG/M2 | OXYGEN SATURATION: 99 % | HEIGHT: 70 IN | WEIGHT: 155 LBS | RESPIRATION RATE: 18 BRPM

## 2024-07-23 DIAGNOSIS — I21.02 ACUTE ST ELEVATION MYOCARDIAL INFARCTION (STEMI) INVOLVING LEFT ANTERIOR DESCENDING (LAD) CORONARY ARTERY (HCC): Primary | ICD-10-CM

## 2024-07-23 DIAGNOSIS — E78.5 DYSLIPIDEMIA: ICD-10-CM

## 2024-07-23 DIAGNOSIS — I30.9 ACUTE PERICARDITIS ASSOCIATED WITH OTHER DISEASE: ICD-10-CM

## 2024-07-23 DIAGNOSIS — I10 PRIMARY HYPERTENSION: ICD-10-CM

## 2024-07-23 DIAGNOSIS — Z59.89 UNINSURED: ICD-10-CM

## 2024-07-23 PROCEDURE — 99214 OFFICE O/P EST MOD 30 MIN: CPT | Performed by: FAMILY MEDICINE

## 2024-07-23 RX ORDER — ATORVASTATIN CALCIUM 20 MG/1
20 TABLET, FILM COATED ORAL DAILY
Qty: 90 TABLET | Refills: 0 | Status: SHIPPED | OUTPATIENT
Start: 2024-07-23

## 2024-07-23 SDOH — ECONOMIC STABILITY - INCOME SECURITY: OTHER PROBLEMS RELATED TO HOUSING AND ECONOMIC CIRCUMSTANCES: Z59.89

## 2024-07-23 NOTE — ASSESSMENT & PLAN NOTE
BP controlled   Med's reconciliation   Stop Amlodipine by Cardiology.  Continue with losartan 25 mg daily, metoprolol succinate 25 mg every 12 hours.  Encourage to check BP once a week.

## 2024-07-23 NOTE — ASSESSMENT & PLAN NOTE
Stable  Denies SOB or chest pain.   Continues with aspirin 81 mg daily, Plavix 75 mg daily, losartan 25 mg daily, metoprolol succinate 25 mg every 12 hours, decrease Lipitor from 80 mg daily to 20 mg daily due to colchicine and interaction per Cardiology.   Increase physical activity and healthy diet   Follow-up cardiac rehabilitation in the near future.

## 2024-07-23 NOTE — PROGRESS NOTES
Ambulatory Visit  Name: Nolan Escobar      : 1960      MRN: [id number]  Encounter Provider: Lay Adler MD  Encounter Date: 2024   Encounter department: [facility]    Assessment & Plan   1. Acute ST elevation myocardial infarction (STEMI) involving left anterior descending (LAD) coronary artery (HCC)  Assessment & Plan:  Stable  Denies SOB or chest pain.   Continues with aspirin 81 mg daily, Plavix 75 mg daily, losartan 25 mg daily, metoprolol succinate 25 mg every 12 hours, decrease Lipitor from 80 mg daily to 20 mg daily due to colchicine and interaction per Cardiology.   Increase physical activity and healthy diet   Follow-up cardiac rehabilitation in the near future.   2. Dyslipidemia  Assessment & Plan:  Stable.  Lipitor from 80 mg daily to 20 mg daily due to colchicine and interaction by Cardiology, Once close routine stop increase Lipitor to 80 mg daily.   Heart healthy diet encouraged   Orders:  -     atorvastatin (LIPITOR) 20 mg tablet; Take 1 tablet (20 mg total) by mouth daily  3. Uninsured  -     Ambulatory Referral to Social Work Care Management Program; Future  4. Primary hypertension  Assessment & Plan:  BP controlled   Med's reconciliation   Stop Amlodipine by Cardiology.  Continue with losartan 25 mg daily, metoprolol succinate 25 mg every 12 hours.  Encourage to check BP once a week.    5. Acute pericarditis associated with other disease  Assessment & Plan:  Denies chest pain.  Continues with Colchicine 0.6 mg daily.        History of Present Illness     Mr. Escobar, a 64-year-old male, is visiting our office for a follow-up visit and medication reconciliation. He has a medical history of hypertension, hyperlipidemia, and a myocardial infarction complicated with pericarditis on 2024. He denies experiencing dyspnea with minimal to moderate exertion, chest pain, palpitations, lightheadedness, or dizziness. He is currently adhering to  "all his medications as prescribed by his cardiologist.        Review of Systems   Constitutional:  Negative for chills and fever.   HENT:  Negative for ear pain and sore throat.    Eyes:  Negative for pain and visual disturbance.   Respiratory:  Negative for cough and shortness of breath.    Cardiovascular:  Negative for chest pain and palpitations.   Gastrointestinal:  Negative for abdominal pain and vomiting.   Genitourinary:  Negative for dysuria and hematuria.   Musculoskeletal:  Negative for arthralgias and back pain.   Skin:  Negative for color change and rash.   Neurological:  Negative for seizures and syncope.   All other systems reviewed and are negative.      Objective     /77 (BP Location: Left arm, Patient Position: Sitting, Cuff Size: Standard)   Pulse 74   Temp 98.2 °F (36.8 °C) (Temporal)   Resp 18   Ht 5' 10\" (1.778 m)   Wt 70.3 kg (155 lb)   SpO2 99%   BMI 22.24 kg/m²     Physical Exam  Vitals and nursing note reviewed.   Constitutional:       General: He is not in acute distress.     Appearance: He is well-developed.   HENT:      Head: Normocephalic and atraumatic.   Eyes:      Conjunctiva/sclera: Conjunctivae normal.   Cardiovascular:      Rate and Rhythm: Normal rate and regular rhythm.      Pulses: Normal pulses.      Heart sounds: Normal heart sounds. No murmur heard.     No friction rub. No gallop.   Pulmonary:      Effort: Pulmonary effort is normal. No respiratory distress.      Breath sounds: Normal breath sounds. No stridor.   Chest:      Chest wall: No tenderness.   Abdominal:      Palpations: Abdomen is soft.      Tenderness: There is no abdominal tenderness.   Musculoskeletal:         General: No swelling.      Cervical back: Neck supple.   Skin:     General: Skin is warm and dry.      Capillary Refill: Capillary refill takes less than 2 seconds.   Neurological:      Mental Status: He is alert.   Psychiatric:         Mood and Affect: Mood normal.       Administrative " Statements

## 2024-07-24 ENCOUNTER — PATIENT OUTREACH (OUTPATIENT)
Dept: FAMILY MEDICINE CLINIC | Facility: CLINIC | Age: 64
End: 2024-07-24

## 2024-07-24 NOTE — ASSESSMENT & PLAN NOTE
Stable.  Lipitor from 80 mg daily to 20 mg daily due to colchicine and interaction by Cardiology, Once close routine stop increase Lipitor to 80 mg daily.   Heart healthy diet encouraged

## 2024-07-24 NOTE — PROGRESS NOTES
JAMIR SORIANO did receive a new referral from provider regarding Pt Patient does not have insurance, please provide some resources. After chart review JAMIR SORIANO did place a call to Pt to assist as needed. JAMIR SORIANO introduced herself, her role, and explained Pt the purpose of this call in Bolivian. Pt seems understanding.    Pt stated that while he was hospitalized, he signed some documents and was told that they helped him to apply for insurance, and he stated that he is confused about the insurance process. JAMIR SORIANO explained Pt that when Pt is hospitalized no insurance the  CM assists Pt in applying for insurance to pay the bills however might be after discharge Pt needs to apply for MA.     JAMIR SORIANO encouraged Pt to come to the office and talk with Financial Counselor (FC). Pt stated that he already spoke with FC and applied for SFS. JAMIR SORIANO explained Pt that if he is unsure whether or not he applied for MA he can talks with FC about it. Pt seems willing to come to the office and speak with FC.   In addition, Pt expressed that he can not work due to medical problems, but he started the SSI process and has been scheduled for an interview within a month. Also, Pt expressed that he receives SNAP benefits, he is aware about food bank, lives in a room and his friend transports him as needed.     JAMIR SORIANO inquired Pt if there is any other social needs that he needs help with. Pt denied other social needs at this time. JAMIR SORIANO informed Pt that he can reach out JAMIR SORIANO for further support Monday through Friday within office hours. Also, JAMIR SORIANO will continue to follow-up to assure if Pt applied for MA. Pt seems understanding and thankful for the JAMIR SORIANO assistance.    JAMIR SORIANO is remain available for further assistance as needed.

## 2024-07-29 ENCOUNTER — PATIENT OUTREACH (OUTPATIENT)
Dept: FAMILY MEDICINE CLINIC | Facility: CLINIC | Age: 64
End: 2024-07-29

## 2024-07-29 ENCOUNTER — TELEPHONE (OUTPATIENT)
Dept: CARDIAC REHAB | Facility: CLINIC | Age: 64
End: 2024-07-29

## 2024-07-29 NOTE — PROGRESS NOTES
JAMIR SORIANO did receive a message from  staff stating that Pt is in the clinic requesting to speak with JAMIR SORIANO. JAMIR SORIANO met with Pt at the clinic today, assisted him in Kyrgyz and did bring him to Financial Counselor (FC). JAMIR SORIANO introduced Pt with FC Lalo Villanueva. Pt showed a letter from his insurance [employer]. Lalo explained Pt that he received that letter in order for him to call them back and provide his PCP name. Once they updates the PCP information in the system Pt will receive his ID card. JAMIR SORIANO and Pt expressed thanked to Lalo Villanueva for her support.     JAMIR SORIANO did bring Pt to the exam room and assisted him to call the insurance since is Kyrgyz speaking. JAMIR SORIANO placed a call to [facility] [employer] at ([phone]) and put the phone in speaker. Representative Sasha assisted Pt and JAMIR SORIANO. JAMIR SORIANO introduced herself, her role and explained the purpose of this call. Sasha inquired Pt for consent in order to speak with JAMIR SORIANO. Pt agreed. JAMIR SORIANO provided Pt's information, his provider name, and clinic address. Sasha informed that Pt's insurance will be active 8/1/2024 and he will receive his ID card in about 2-3 weeks. JAMIR SORIANO expressed appreciation to Sasha for her time and support.     JAMIR SORIANO inquired Pt is there is any other social needs that he needs help with. Pt declined social needs at this time. JAMIR SORIANO explained Pt that JAMIR SORIANO will close this referral today however, he can reach ou the JAMIR  for further assistance Monday through Friday within office hours. Pt seems understanding and grateful for the JAMIR SORIANO support.     JAMIR SORIANO is closing case today due to Pt declined other social needs at this time.  JAMIR SORIANO is remain available for further assistance as needed.

## 2024-08-12 ENCOUNTER — TELEPHONE (OUTPATIENT)
Dept: FAMILY MEDICINE CLINIC | Facility: CLINIC | Age: 64
End: 2024-08-12

## 2024-08-12 NOTE — TELEPHONE ENCOUNTER
Patient came into office today requesting to be contacted by Rekha  to discuss financial issues.     Please reach out to pt. Thanks!

## 2024-08-13 ENCOUNTER — PATIENT OUTREACH (OUTPATIENT)
Dept: FAMILY MEDICINE CLINIC | Facility: CLINIC | Age: 64
End: 2024-08-13

## 2024-08-13 NOTE — PROGRESS NOTES
JAMIR SORIANO did receive an IB message from  staff stating that Patient came into office today requesting to be contacted by Rekha  to discuss financial issues. After chart review JAMIR SORIANO did place a call to Pt to assist as needed. Pt stated that he is coming to the clinic today since he wants to talk with JAMIR SORIANO in person.     JAMIR SORIANO met with Pt at the [facility] and did bring him to the exam room. Pt stated that he wants JAMIR SORIANO to read a letter that he received from the hospital. Pt received a bill from the hospital, JAMIR SORIANO explained Pt that he can call them and provide his MA information in order to pay the bill. Pt seems understanding.    Also, JAMIR SORIANO informed Pt that he can reach out the JAMIR SORIANO for further assistance Monday through Friday within office hours. Pt seems understanding and thankful for the JAMIR SORIANO support.    JAMIR SORIANO is remain available for further assistance as needed.

## 2024-08-15 ENCOUNTER — TELEPHONE (OUTPATIENT)
Dept: FAMILY MEDICINE CLINIC | Facility: CLINIC | Age: 64
End: 2024-08-15

## 2024-08-15 ENCOUNTER — OFFICE VISIT (OUTPATIENT)
Dept: FAMILY MEDICINE CLINIC | Facility: CLINIC | Age: 64
End: 2024-08-15

## 2024-08-15 ENCOUNTER — PATIENT OUTREACH (OUTPATIENT)
Dept: FAMILY MEDICINE CLINIC | Facility: CLINIC | Age: 64
End: 2024-08-15

## 2024-08-15 VITALS
HEIGHT: 70 IN | TEMPERATURE: 97.5 F | DIASTOLIC BLOOD PRESSURE: 82 MMHG | WEIGHT: 159.4 LBS | OXYGEN SATURATION: 97 % | SYSTOLIC BLOOD PRESSURE: 142 MMHG | RESPIRATION RATE: 14 BRPM | BODY MASS INDEX: 22.82 KG/M2 | HEART RATE: 71 BPM

## 2024-08-15 DIAGNOSIS — Z79.899 POLYPHARMACY: ICD-10-CM

## 2024-08-15 DIAGNOSIS — Z95.5 STATUS POST INSERTION OF DRUG-ELUTING STENT INTO LEFT ANTERIOR DESCENDING (LAD) ARTERY: Primary | ICD-10-CM

## 2024-08-15 DIAGNOSIS — I10 PRIMARY HYPERTENSION: ICD-10-CM

## 2024-08-15 DIAGNOSIS — I30.9 ACUTE PERICARDITIS ASSOCIATED WITH OTHER DISEASE: ICD-10-CM

## 2024-08-15 PROBLEM — I50.21 ACUTE SYSTOLIC CONGESTIVE HEART FAILURE (HCC): Status: ACTIVE | Noted: 2024-08-15

## 2024-08-15 PROCEDURE — 99213 OFFICE O/P EST LOW 20 MIN: CPT | Performed by: FAMILY MEDICINE

## 2024-08-15 NOTE — PROGRESS NOTES
Ambulatory Visit  Name: Nolan Escobar      : 1960      MRN: [id number]  Encounter Provider: Rebecca Fay Kab-Perlman, MD  Encounter Date: 8/15/2024   Encounter department: [facility]    Assessment & Plan   1. Status post insertion of drug-eluting stent into left anterior descending (LAD) artery  Assessment & Plan:  -STEMI on 24 with placement of drug eluting stent, did not go to rehab  -echo on 24 with EF 31%  -follows with cardiology has f/u scheduled   -home meds: aspirin, atorvastatin, plavix, losartan and metoprolol  -ASCVD risk score of 14.0-11.3%    PLAN  -referral team enlisted to assist with scheduling with cardiac rehab, has upcoming appointment this Monday and was able to fit him into my schedule Monday too; plan is if he agrees to go to cardiac rehab we can write him a note to go to work part time with limitations; he will inquire with his work if he can go part-time meanwhile  -educated and counseled Nolan thoroughly on his current health condition and the importance of prioritizing his heart health  -discussed with social work who saw patient today   Orders:  -     Ambulatory  Referral to Cardiac Rehabilitation; Future  2. Polypharmacy  Comments:  -using 5 medications in the AM and one PM  -review of medications concludes missing a medication vs. missing a dose    PLAN  -on f/u bring meds to review  3. Primary hypertension  Assessment & Plan:  -bp today of <142/82 and not at goal of < 140/80 per [facility] but very close  -no home bp logs   -home meds: losartan 25mg and metoprolol succinate XL 25mg BID   Follows with cardiology  -<2 months post STEMI and subsequent placement of drug eluting stent in LAD     PLAN  -continue above medications and monitor bp  4. Acute pericarditis associated with other disease  Assessment & Plan:  -s/p placement of drug eluting stent for NSTEMI  -home med: colchicine  -follows with cardiology    PLAN  -continue  with colchicine and cardiology follow-up       History of Present Illness       Interpretor used [place] ID#: [id number]  Nolan Escobar is a 65 yo male patient presenting today requesting a letter to go back to work. Saw cardiology was suppose to do cardiac rehab and notes states that he was going to go on diasbility. Recent echo reveals EF 31% 7/1/24.    Works in a factory packing food (I.e cereal, cookies). Today is asking for a note to allow him to go back to work. He has to pay $600 a month in rent. He has no support system here and if he can't go back to work he is concerned he will have to go back to [place] where his family is. He has been in this country for about 5 years now.     Takes 5 medications in the morning and one at night. Doesn't know the names of them. Had them marked by the pharmacy to assist him.     Review of Systems   Constitutional:  Negative for fever.   Respiratory:  Negative for shortness of breath.    Cardiovascular:  Negative for chest pain, palpitations and leg swelling.   Neurological:  Negative for weakness.   Hematological:  Negative for adenopathy. Does not bruise/bleed easily.     Pertinent Medical History   STEMI requiring emergent stent placement in June 2024    Medical History Reviewed by provider this encounter:       Past Medical History   History reviewed. No pertinent past medical history.  Past Surgical History:   Procedure Laterality Date    CARDIAC CATHETERIZATION N/A 6/30/2024    Procedure: Cardiac pci;  Surgeon: Jovany Wise MD;  Location: [facility];  Service: Cardiology    CARDIAC CATHETERIZATION N/A 6/30/2024    Procedure: Cardiac Coronary Angiogram;  Surgeon: Jovany Wise MD;  Location: [facility];  Service: Cardiology    CARDIAC CATHETERIZATION N/A 6/30/2024    Procedure: Cardiac IVUS;  Surgeon: Jovany Wise MD;  Location: [facility];  Service: Cardiology    CARDIAC CATHETERIZATION Left 6/30/2024    Procedure: Cardiac Left Heart  Cath;  Surgeon: Jovany Wise MD;  Location: [facility];  Service: Cardiology     History reviewed. No pertinent family history.  Current Outpatient Medications on File Prior to Visit   Medication Sig Dispense Refill    aspirin (ECOTRIN LOW STRENGTH) 81 mg EC tablet Take 1 tablet (81 mg total) by mouth daily 90 tablet 0    atorvastatin (LIPITOR) 20 mg tablet Take 1 tablet (20 mg total) by mouth daily 90 tablet 0    clopidogrel (PLAVIX) 75 mg tablet Take 1 tablet (75 mg total) by mouth daily 90 tablet 4    colchicine (COLCRYS) 0.6 mg tablet Take 1 tablet (0.6 mg total) by mouth daily 30 tablet 0    losartan (COZAAR) 25 mg tablet Take 1 tablet (25 mg total) by mouth daily 90 tablet 4    metoprolol succinate (TOPROL-XL) 25 mg 24 hr tablet Take 1 tablet (25 mg total) by mouth 2 (two) times a day 180 tablet 3     No current facility-administered medications on file prior to visit.   No Known Allergies   Current Outpatient Medications on File Prior to Visit   Medication Sig Dispense Refill    aspirin (ECOTRIN LOW STRENGTH) 81 mg EC tablet Take 1 tablet (81 mg total) by mouth daily 90 tablet 0    atorvastatin (LIPITOR) 20 mg tablet Take 1 tablet (20 mg total) by mouth daily 90 tablet 0    clopidogrel (PLAVIX) 75 mg tablet Take 1 tablet (75 mg total) by mouth daily 90 tablet 4    colchicine (COLCRYS) 0.6 mg tablet Take 1 tablet (0.6 mg total) by mouth daily 30 tablet 0    losartan (COZAAR) 25 mg tablet Take 1 tablet (25 mg total) by mouth daily 90 tablet 4    metoprolol succinate (TOPROL-XL) 25 mg 24 hr tablet Take 1 tablet (25 mg total) by mouth 2 (two) times a day 180 tablet 3     No current facility-administered medications on file prior to visit.      Social History     Tobacco Use    Smoking status: Never     Passive exposure: Never    Smokeless tobacco: Never   Vaping Use    Vaping status: Never Used   Substance and Sexual Activity    Alcohol use: Not Currently    Drug use: Never    Sexual activity: Not on  "file     Objective     /82 (BP Location: Left arm, Patient Position: Sitting, Cuff Size: Standard)   Pulse 71   Temp 97.5 °F (36.4 °C) (Temporal)   Resp 14   Ht 5' 10\" (1.778 m)   Wt 72.3 kg (159 lb 6.4 oz)   SpO2 97%   BMI 22.87 kg/m²     Physical Exam  Constitutional:       Appearance: Normal appearance. He is normal weight.   HENT:      Head: Normocephalic and atraumatic.      Nose: Nose normal.      Mouth/Throat:      Mouth: Mucous membranes are moist.      Pharynx: Oropharynx is clear.   Eyes:      Extraocular Movements: Extraocular movements intact.      Conjunctiva/sclera: Conjunctivae normal.   Cardiovascular:      Rate and Rhythm: Normal rate and regular rhythm.      Pulses: Normal pulses.      Heart sounds: Normal heart sounds. No murmur heard.     No friction rub. No gallop.   Pulmonary:      Effort: Pulmonary effort is normal.      Breath sounds: Normal breath sounds. No wheezing, rhonchi or rales.   Musculoskeletal:         General: Normal range of motion.      Cervical back: Normal range of motion.      Comments: Muscle strength 5+ upper and lower extremity   Skin:     General: Skin is warm.      Capillary Refill: Capillary refill takes less than 2 seconds.   Neurological:      Mental Status: He is alert.   Psychiatric:      Comments: -confused with poor understanding and difficulty interpreting the seriousness of his condition: possible denial        Administrative Statements         "

## 2024-08-15 NOTE — PROGRESS NOTES
Received in-person consult from provider, regarding pt had a stroke, cannot work, have limited support and is having financial difficulties.     Per chart review, SW CM Reyes has met with pt and completed assessment. Pt have MA and SNAP benefits.     JAMIR SORIANO met with pt, introduced self and role in [place]. Pt reports he cannot afford paying his rent. He rent at his friend's house and he is behind on this month. Pt states he would like to go back to work or he is considering to go back to his country [place].   Provider was in the room and pt was informs he need to follow-up with [facility] as his heart still weak. The provider only recommended working part-time but he must follow-up with [facility]. Pt missed his appointment with Card Rehab on 7/29.    Pt was informs the Provider was going to speaks with the referral specialist to get pt scheduled with Card Rehab asap. Pt was informs informs the Provider wants to see him for follow-up in a week. Pt verbalized understanding.     Pt report his son lives in [facility] and he is thinking of moving to [place] and have pt move in with him. Pt states he feel embraced and prideful to ask his son for financial support. Pt states he has a home and family in [facility]. Pt was informs by the Provider is very important he continue with his treatment. Pt verbalized understanding.     The appointment for [facility] was scheduled for 8/20. Appointment information was provided to pt.    Pt have a copy of a bill he received for [facility] . JAMIR SORIANO took pt to the  to see if pt's insurance will covered the bill. JAMIR SORIANO was informs by  pt's insurance became active on 8/1/24 and the bill is from 6/30/2024. Pt was informs he need to call or go to [employer] and bring copy of the bills so [employer] could add the bills to his account. Pt was provided with [employer] address and phone number.   Pt verbalized understanding and thanked JAMIR SORIANO for the help. JAMIR SORIANO informs pt he could reach out  to [facility] as needed. No further outreach scheduled at this time.     Insurance is active 8/1 per FC   Need to go to [employer] to

## 2024-08-15 NOTE — TELEPHONE ENCOUNTER
Pt called and stated if yo could make a letter for work saying that he is ready to start working. Pt said he feel better and he needs to start working.    Please advise, thank you

## 2024-08-18 PROBLEM — Z95.5 STATUS POST INSERTION OF DRUG-ELUTING STENT INTO LEFT ANTERIOR DESCENDING (LAD) ARTERY: Status: ACTIVE | Noted: 2024-08-18

## 2024-08-18 NOTE — ASSESSMENT & PLAN NOTE
-s/p placement of drug eluting stent for NSTEMI  -home med: colchicine  -follows with cardiology    PLAN  -continue with colchicine and cardiology follow-up

## 2024-08-18 NOTE — ASSESSMENT & PLAN NOTE
-STEMI on 6/30/24 with placement of drug eluting stent, did not go to rehab  -echo on 7/1/24 with EF 31%  -follows with cardiology has f/u scheduled   -home meds: aspirin, atorvastatin, plavix, losartan and metoprolol  -ASCVD risk score of 14.0-11.3%    PLAN  -referral team enlisted to assist with scheduling with cardiac rehab, has upcoming appointment this Monday and was able to fit him into my schedule Monday too; plan is if he agrees to go to cardiac rehab we can write him a note to go to work part time with limitations; he will inquire with his work if he can go part-time meanwhile  -educated and counseled Nolan thoroughly on his current health condition and the importance of prioritizing his heart health  -discussed with social work who saw patient today

## 2024-08-18 NOTE — ASSESSMENT & PLAN NOTE
-bp today of <142/82 and not at goal of < 140/80 per [facility]ENIA but very close  -no home bp logs   -home meds: losartan 25mg and metoprolol succinate XL 25mg BID   Follows with cardiology  -<2 months post STEMI and subsequent placement of drug eluting stent in LAD     PLAN  -continue above medications and monitor bp

## 2024-08-19 ENCOUNTER — OFFICE VISIT (OUTPATIENT)
Dept: FAMILY MEDICINE CLINIC | Facility: CLINIC | Age: 64
End: 2024-08-19

## 2024-08-19 VITALS
TEMPERATURE: 97 F | WEIGHT: 158.4 LBS | RESPIRATION RATE: 16 BRPM | HEIGHT: 70 IN | BODY MASS INDEX: 22.68 KG/M2 | DIASTOLIC BLOOD PRESSURE: 81 MMHG | HEART RATE: 59 BPM | SYSTOLIC BLOOD PRESSURE: 131 MMHG | OXYGEN SATURATION: 98 %

## 2024-08-19 DIAGNOSIS — Z95.5 STATUS POST INSERTION OF DRUG-ELUTING STENT INTO LEFT ANTERIOR DESCENDING (LAD) ARTERY: Primary | ICD-10-CM

## 2024-08-19 DIAGNOSIS — Z79.899 ENCOUNTER FOR MEDICATION REVIEW: ICD-10-CM

## 2024-08-19 NOTE — PROGRESS NOTES
Ambulatory Visit  Name: Nolan Escobar      : 1960      MRN: [id number]  Encounter Provider: Rebecca Fay Kab-Perlman, MD  Encounter Date: 2024   Encounter department: [facility]    Assessment & Plan   1. Status post insertion of drug-eluting stent into left anterior descending (LAD) artery  Assessment & Plan:  -STEMI on 24 with placement of drug eluting stent, did not go to rehab  -echo on 24 with EF 31%  -follows with cardiology has f/u scheduled   -home meds: aspirin, atorvastatin, plavix, losartan and metoprolol  -ASCVD risk score of 14.0-11.3%  -With help of referral team, he has cardiac rehab scheduled for tomorrow  -Social work note reviewed    PLAN  -Note provided that he can work full time with restrictions, largely sedentary work, and hand written in that he must be allowed to rest if he feels overexerted  -educated and counseled Nolan thoroughly on his current health condition and the importance of prioritizing his heart health  -Mentioned to me that he has a brother in  who is in a coma and he may go visit him if his death is close; Nolan asked me if it is okay if he visits to which I explained it is fine but I recommended he make sure he has enough of his medications and that he doesn't stay too long so that he can receive cardiac rehab; emphasized making time for family with proper preparation for self care  2. Encounter for medication review  Comments:  -Reviewed medications; using appropriately except colchicine needs to take at 12 PM rather than the morning, clarified with patient how to use       History of Present Illness       [place] ID # [id number]  Nolan Escobar is a 64-year-old male patient presenting today for this follow-up visit in order to obtain a note that he can go to work part-time.  He had a STEMI in nd is status post a left anterior descending drug-eluting stent, he was supposed to complete  cardiac rehab and did not.  As such tomorrow he has an appointment with cardiac rehab and on his last office visit with vianney agreed that if he attends cardiac rehab we will give him permission to work part-time. [place] this [place] on [place] own without a support system and his rent costs him 600 a month therefore he needs to work.  On his previous office visit we involved social work to help him get resources. For today's visit we will also perform a medication review as he was advised to bring in all of his home medications.     Today he states that he does not think part time would work.     Review of Systems   Constitutional:  Negative for fever.   Respiratory:  Negative for shortness of breath.    Cardiovascular:  Negative for chest pain, palpitations and leg swelling.   Neurological:  Negative for headaches.     Pertinent Medical History     STEMI requiring emergent stent placement in June 2024    Medical History Reviewed by provider this encounter:           Medical History Reviewed by provider this encounter:  Meds       Past Medical History   History reviewed. No pertinent past medical history.  Past Surgical History:   Procedure Laterality Date    CARDIAC CATHETERIZATION N/A 6/30/2024    Procedure: Cardiac pci;  Surgeon: Jovany Wise MD;  Location: [facility];  Service: Cardiology    CARDIAC CATHETERIZATION N/A 6/30/2024    Procedure: Cardiac Coronary Angiogram;  Surgeon: Jovany Wise MD;  Location: [facility];  Service: Cardiology    CARDIAC CATHETERIZATION N/A 6/30/2024    Procedure: Cardiac IVUS;  Surgeon: Jovany Wise MD;  Location: [facility];  Service: Cardiology    CARDIAC CATHETERIZATION Left 6/30/2024    Procedure: Cardiac Left Heart Cath;  Surgeon: Jovany Wise MD;  Location: [facility];  Service: Cardiology     History reviewed. No pertinent family history.  Current Outpatient Medications on File Prior to Visit   Medication Sig Dispense Refill    aspirin  "(ECOTRIN LOW STRENGTH) 81 mg EC tablet Take 1 tablet (81 mg total) by mouth daily 90 tablet 0    atorvastatin (LIPITOR) 20 mg tablet Take 1 tablet (20 mg total) by mouth daily 90 tablet 0    clopidogrel (PLAVIX) 75 mg tablet Take 1 tablet (75 mg total) by mouth daily 90 tablet 4    losartan (COZAAR) 25 mg tablet Take 1 tablet (25 mg total) by mouth daily 90 tablet 4    metoprolol succinate (TOPROL-XL) 25 mg 24 hr tablet Take 1 tablet (25 mg total) by mouth 2 (two) times a day 180 tablet 3    colchicine (COLCRYS) 0.6 mg tablet Take 1 tablet (0.6 mg total) by mouth daily 30 tablet 0     No current facility-administered medications on file prior to visit.   No Known Allergies   Current Outpatient Medications on File Prior to Visit   Medication Sig Dispense Refill    aspirin (ECOTRIN LOW STRENGTH) 81 mg EC tablet Take 1 tablet (81 mg total) by mouth daily 90 tablet 0    atorvastatin (LIPITOR) 20 mg tablet Take 1 tablet (20 mg total) by mouth daily 90 tablet 0    clopidogrel (PLAVIX) 75 mg tablet Take 1 tablet (75 mg total) by mouth daily 90 tablet 4    losartan (COZAAR) 25 mg tablet Take 1 tablet (25 mg total) by mouth daily 90 tablet 4    metoprolol succinate (TOPROL-XL) 25 mg 24 hr tablet Take 1 tablet (25 mg total) by mouth 2 (two) times a day 180 tablet 3    colchicine (COLCRYS) 0.6 mg tablet Take 1 tablet (0.6 mg total) by mouth daily 30 tablet 0     No current facility-administered medications on file prior to visit.      Social History     Tobacco Use    Smoking status: Never     Passive exposure: Never    Smokeless tobacco: Never   Vaping Use    Vaping status: Never Used   Substance and Sexual Activity    Alcohol use: Not Currently    Drug use: Never    Sexual activity: Not on file     Objective     /81 (BP Location: Left arm, Patient Position: Sitting, Cuff Size: Standard)   Pulse 59   Temp (!) 97 °F (36.1 °C) (Temporal)   Resp 16   Ht 5' 10\" (1.778 m)   Wt 71.8 kg (158 lb 6.4 oz)   SpO2 98%   BMI " 22.73 kg/m²     Physical Exam  Constitutional:       Appearance: Normal appearance. He is normal weight.   HENT:      Head: Normocephalic and atraumatic.   Cardiovascular:      Rate and Rhythm: Normal rate and regular rhythm.      Pulses: Normal pulses.      Heart sounds: Normal heart sounds. No murmur heard.     No friction rub. No gallop.      Comments: Slightly difficult to auscultate heart sounds   Pulmonary:      Effort: Pulmonary effort is normal.      Breath sounds: Normal breath sounds.   Skin:     General: Skin is warm.      Capillary Refill: Capillary refill takes less than 2 seconds.   Neurological:      Mental Status: He is alert.   Psychiatric:         Mood and Affect: Mood normal.       Administrative Statements

## 2024-08-19 NOTE — ASSESSMENT & PLAN NOTE
-STEMI on 6/30/24 with placement of drug eluting stent, did not go to rehab  -echo on 7/1/24 with EF 31%  -follows with cardiology has f/u scheduled   -home meds: aspirin, atorvastatin, plavix, losartan and metoprolol  -ASCVD risk score of 14.0-11.3%  -With help of referral team, he has cardiac rehab scheduled for tomorrow  -Social work note reviewed    PLAN  -Note provided that he can work full time with restrictions, largely sedentary work, and hand written in that he must be allowed to rest if he feels overexerted  -educated and counseled Nolan thoroughly on his current health condition and the importance of prioritizing his heart health  -Mentioned to me that he has a brother in  who is in a coma and he may go visit him if his death is close; Nolan asked me if it is okay if he visits to which I explained it is fine but I recommended he make sure he has enough of his medications and that he doesn't stay too long so that he can receive cardiac rehab; emphasized making time for family with proper preparation for self care

## 2024-08-19 NOTE — LETTER
August 19, 2024     Patient: Nolan Escobar  YOB: 1960  Date of Visit: 8/19/2024      To Whom it May Concern:    Nolan Escobar is under my professional care. Nolan was seen in my office on 8/19/2024. Nolan may return to work on 8/21/24 part time to start until his medical condition improves which may take several months. He is limited to less than 5 lbs of weight bearing and to avoid strenuous activity of any kind until his condition improves. If he requires rest he must be allowed to rest. .    If you have any questions or concerns, please don't hesitate to call.         Sincerely,          Rebecca Fay Kab-Perlman, MD

## 2024-08-19 NOTE — LETTER
August 19, 2024     Patient: Nolan Escobar  YOB: 1960  Date of Visit: 8/19/2024      To Whom it May Concern:    Nolan Escobar is under my professional care. Nolan was seen in my office on 8/19/2024. Nolan may return to work on 8/21/24. Due to his medical condition he is unable to lift heavy objects of more than 5 lbs until his condition improves which may take several months. He is able to work full time if the work is sedentary.  .    If you have any questions or concerns, please don't hesitate to call.         Sincerely,          Rebecca Fay Kab-Perlman, MD

## 2024-08-20 ENCOUNTER — CLINICAL SUPPORT (OUTPATIENT)
Dept: CARDIAC REHAB | Facility: CLINIC | Age: 64
End: 2024-08-20
Payer: COMMERCIAL

## 2024-08-20 DIAGNOSIS — Z95.5 STATUS POST INSERTION OF DRUG-ELUTING STENT INTO LEFT ANTERIOR DESCENDING (LAD) ARTERY: ICD-10-CM

## 2024-08-20 DIAGNOSIS — I21.3 STEMI (ST ELEVATION MYOCARDIAL INFARCTION) (HCC): ICD-10-CM

## 2024-08-20 DIAGNOSIS — I21.3 ST ELEVATION MYOCARDIAL INFARCTION (STEMI), UNSPECIFIED ARTERY (HCC): Primary | ICD-10-CM

## 2024-08-20 PROCEDURE — 93797 PHYS/QHP OP CAR RHAB WO ECG: CPT

## 2024-08-20 NOTE — PROGRESS NOTES
CARDIAC REHABILITATION   ASSESSMENT AND INDIVIDUALIZED TREATMENT PLAN  INITIAL         Today's date: 2024   # of Exercise Sessions Completed: 1  Patient name: Nolan Escobar      : 1960  Age: 64 y.o.       MRN: [id number]  Referring Physician: Sly Alston DO  Cardiologist: Arnaldo Carlin DO  Provider: [place]  Clinician: Sunshine Patel MS, CEP      Treatment is tailored to this patient's individual needs.  The ITP was reviewed with the patient and all questions were answered to their satisfaction.  Additional ITP documentation can be found electronically including daily and monthly exercise summaries, daily session notes with ECG summaries, education notes, daily medication reconciliation, and daily physician supervision.      Comments: [employer]  was used for the purpose of today's appointment. Today is Nolan's initial evaluation to begin Cardiac Rehab now 9 wks post STEMI/stent to 100% stenosis of proxLAD. Nolan has a history of HTN, HLD post MI pericarditis, ischemic cardiomyopathy (EF 31%). No LifeVest due to limited insurance coverage. Nolan walks every day at home around the neighborhood for 3 hours most days of the week and walked to his cardiac rehab appt today. He has resumed all ADLs and is anxious to get back to work when able due to financial constraint. It is possible that Nolan returns to [place] if he cannot work here in the [place] because of his health. He has a note to return to work light duty, however, work will not accept light duty and will not allow him back to work until he can safely lift 30-40 lb.Today, Nolan completed an initial submaximal TM ETT. We also discussed current dietary habits and goals of heart healthy eating for lipid management and BP control . He reports eating healthy now with less fat and less salt. When addressed, Nolan denies having depression/anxiety symptoms and reports fair  social/emotional support. Initial patient questionnaires were not completed by patient today due to patient's disinterest in completing either in Andorran or English.      Dx:   Encounter Diagnoses   Name Primary?    STEMI (ST elevation myocardial infarction) (HCC)     Status post insertion of drug-eluting stent into left anterior descending (LAD) artery     ST elevation myocardial infarction (STEMI), unspecified artery (HCC) Yes       Description of Diagnosis: STEMI,  s/p [place] drug-eluting stent x 2 to proxLAD 100% stenosis, 0% residual stenosis   Date of onset: 6/30/24  Other Cardiac History: Ischemic cardiomyopathy (EF 31%), post MI pericarditis        ASSESSMENT    Medical History:   No past medical history on file.    Family History:  No family history on file.    Allergies:   Patient has no known allergies.    Current Medications:   Current Outpatient Medications   Medication Sig Dispense Refill    aspirin (ECOTRIN LOW STRENGTH) 81 mg EC tablet Take 1 tablet (81 mg total) by mouth daily 90 tablet 0    atorvastatin (LIPITOR) 20 mg tablet Take 1 tablet (20 mg total) by mouth daily 90 tablet 0    clopidogrel (PLAVIX) 75 mg tablet Take 1 tablet (75 mg total) by mouth daily 90 tablet 4    colchicine (COLCRYS) 0.6 mg tablet Take 1 tablet (0.6 mg total) by mouth daily 30 tablet 0    losartan (COZAAR) 25 mg tablet Take 1 tablet (25 mg total) by mouth daily 90 tablet 4    metoprolol succinate (TOPROL-XL) 25 mg 24 hr tablet Take 1 tablet (25 mg total) by mouth 2 (two) times a day 180 tablet 3     No current facility-administered medications for this visit.       Medication compliance: Yes   Comments: Pt reports to be compliant with medications    Physical Limitations: None    Fall Risk: Low   Comments: Ambulates with a steady gait with no assist device and Denies a fall in the past 6 months    Cultural needs: Requires Andorran Interpretation      CAD Risk Factors:  Cholesterol: Yes  HTN: Yes  DM: No  Obesity: No  "  Inactivity: No      EXERCISE ASSESSMENT:     Initial Fitness Assessment: Submaximal TM ETT:  Resting:  BP: 138/88  HR: 69 bpm, Exercise:  BP: 152/92  HR: 105 bpm, METs:  3.1, ECG Summary: NSR, TWI, and Test terminated at:  RPE 6      ECG INTERPRETATION:  NSR, TWI    Current Functional Status:  Occupation: full time job - has not been working since MI - plans to go back to work tomorrow (light duty - no more than 5 lb) - generally needs to lift 30-40 lb at a time - waiting for call back from work  Recreation/Physical Activity: eating and sleeping  ADL’s: Resumed all ADLs  [place]: able to perform self care, resumed all ADLs  Home exercise: Type: walking, Frequency: 5-7 days/week, up to 3 hours/day  Other Comments: had been walking prior to his MI also      SMART Exercise Goals:   10% improvement in functional capacity based on max METs achieved in initial fitness assessment  increased exercise capacity by 40% based on peak METs tolerated in cardiac rehab exercise session    Patient Specific EXERCISE GOALS:       Return to work without restriction      PSYCHOSOCIAL ASSESSMENT:    Pt self-report of depression and anxiety   Patient reports they are coping well with good social support and denies depression or anxiety    Self-reported stress level:  1      SMART Psychosocial Goals:      Increased interest and pleasure in doing things and  reduced time feeling down, depressed or hopeless    Patient Specific PSYCHOCOSOCIAL GOALS:    No concerns at this time - very happy fuad     Social Support:   significant other and friends and family     Psychosocial Assessment as it relates to rehabilitation:   Patient reports that financial stressors could limit his participation in cardiac rehab.      NUTRITION ASSESSMENT:    Initial Weight:  159.8 lb  Current Weight: 159.8 lb    Height:   Ht Readings from Last 1 Encounters:   08/19/24 5' 10\" (1.778 m)       Diabetes: N/A  A1c: 5.8%    last measured: 7/1/24    Lipid management: " Discussed diet and lipid management and Last lipid profile 6/30/24  Chol 232    HDL 30      Current Dietary Habits:  Reduced fat and sodium intake    SMART Nutrition Goals:   never add salt to food when cooking or at the table and choose low sodium canned, frozen/packaged foods or rarely/never eat    Patient Specific NUTRITION GOALS:     1. Improved lipid panel      OTHER CORE COMPONENT ASSESSMENT:    Tobacco Use:     N/A:  Patient is a non-smoker     Anginal Symptoms:   Chest pain   NTG use: No prescription    SMART Goals:   consistent, controlled resting BP < 130/80 and medication compliance    Patient Specific CORE COMPONENT GOALS:    Return to work - or travel out of the country      INDIVIDUALIZED TREATMENT PLAN      EXERCISE GOALS and PLAN      Progress toward Exercise goals:   Reviewed Pt goals and determined plan of care    Exercise Intervention:    Start cardiac rehab if patient is still in the country once insurance auth is obtained since pt cannot afford co-pay/coinsurance      Current Aerobic Exercise Prescription:      Frequency: 3 days/week   Supplement with home walking 2+ days/wk as tolerated      Minutes: 30 - 40         METS: 2-3.5            HR: RHR +30-40bpm ( bpm)   RPE: 4-6         Modalities: Treadmill, UBE, Lifecycle, and NuStep     Exercise workloads will be progressed gradually as tolerated, within limits of patient's ability, and according to the patient's response to the exercise program.      Aerobic Exercise Prescription Plan for Progression   Frequency: 3 days/week of cardiac rehab       Supplement with home exercise 2+ days/wk as tolerated    Minutes: 40       >150 mins/wk of moderate intensity exercise   METS: 2-3.5   HR: RHR +30-40bpm     RPE: 4-6   Modalities: Treadmill, UBE, Lifecycle, NuStep, and Recumbent bike    Strength training:  Will be added following 2-3 weeks of monitored exercise sessions   Modalities: Leg Press, Chest Press, Pull Downs, Lateral  Raise, Arm Extension, Arm Curl, Front Raises, Shoulder Shrugs, and Calf Raises    Home Exercise: Type: walking, Frequency: 5-7 days/week, Duration: 180 mins    Exercise Education: benefit of exercise for CAD risk factors     Readiness to change: Action:  (Changing behavior)      NUTRITION GOALS AND PLAN      Nutritional   Discussed key elements of heart healthy eating. Reviewed patient goals for dietary modifications and their clinical implications. Reviewed most recent lipid profile.     Patient's progress toward Nutrition goals:    Reviewed Pt goals and determined plan of care      Nutrition Intervention:   never/rarely add salt to food when cooking or at the table, choose low sodium canned, frozen, packaged foods or rarely eat these foods, and rarely/never eat salty snacks    Nutrition Education:   low sodium diet  nutrition for  lipid management    Readiness to change: Action:  (Changing behavior)      PSYCHOSOCIAL GOALS AND PLAN    Psychosocial Assessment as it relates to rehabilitation:   Patient denies issues with his/her family or home life that may affect their rehabilitation efforts.     Patient's progress toward Psychosocial goals:    Reviewed Pt goals and determined plan of care    Psychosocial Intervention:   Exercise and Keep a positive mindset    Psychosocial Education: benefits of a positive support system and depression and CAD    Information to utilize [employer] was provided as well as contact information for counseling through  Behavioral Health and group psychotherapy groups available.    Readiness to change: Preparation:  (Getting ready to change)       OTHER CORE COMPONENTS GOALS and PLAN      Blood Pressure will be monitored throughout the program and cardiologist will be notified of elevated trends.    Pt will be encouraged to monitor home BP if advised by cardiologist.    Tobacco Intervention:   N/A:  Pt is a non-smoker    Progress toward Core Component goals:   Reviewed Pt goals and  determined plan of care    Other Core Components Intervention:   medication compliance and engage in regular exercise    Group and Individual Education:  understanding high blood pressure and it's relationship to CAD and low sodium diet and heart failure    Readiness to change: Preparation:  (Getting ready to change)

## 2024-08-26 ENCOUNTER — OFFICE VISIT (OUTPATIENT)
Dept: FAMILY MEDICINE CLINIC | Facility: CLINIC | Age: 64
End: 2024-08-26

## 2024-08-26 VITALS
WEIGHT: 159 LBS | BODY MASS INDEX: 22.76 KG/M2 | HEIGHT: 70 IN | SYSTOLIC BLOOD PRESSURE: 122 MMHG | OXYGEN SATURATION: 97 % | DIASTOLIC BLOOD PRESSURE: 70 MMHG | HEART RATE: 76 BPM | TEMPERATURE: 98.2 F | RESPIRATION RATE: 18 BRPM

## 2024-08-26 DIAGNOSIS — I30.9 ACUTE PERICARDITIS ASSOCIATED WITH OTHER DISEASE: ICD-10-CM

## 2024-08-26 DIAGNOSIS — I10 PRIMARY HYPERTENSION: ICD-10-CM

## 2024-08-26 DIAGNOSIS — Z95.5 STATUS POST INSERTION OF DRUG-ELUTING STENT INTO LEFT ANTERIOR DESCENDING (LAD) ARTERY: Primary | ICD-10-CM

## 2024-08-26 PROCEDURE — 99213 OFFICE O/P EST LOW 20 MIN: CPT | Performed by: FAMILY MEDICINE

## 2024-08-26 NOTE — PROGRESS NOTES
Ambulatory Visit  Name: Nolan Escobar      : 1960      MRN: [id number]  Encounter Provider: Vijay Nguyen MD  Encounter Date: 2024   [facility]    Assessment & Plan   1. Status post insertion of drug-eluting stent into left anterior descending (LAD) artery  Assessment & Plan:  -STEMI on 24 with placement of drug eluting stent, did not go to rehab  -echo on 24 with EF 31%  -follows with cardiology has f/u scheduled   -home meds: aspirin, atorvastatin, plavix, losartan and metoprolol  -ASCVD risk score of 14.0-11.3%     Medications reviewed with patient in office, patient has appropriate understanding of the risks of medication non adherence   Patient had f/u schedule with Cardio on , however will be in [place] at the time and miss the appt   - will reach out to Cardiology office for possible cancellation, and scheduling prior to him leaving   - recommended he be seen by a provider if he is planning on staying in [place] for a prolonged period  - Continue Toprol 25 mg BID, Losartan 25 mg daily, Lipitor 20 mg daily, Aspirin 81 mg daily and Plavix 75 mg daily   2. Primary hypertension  Assessment & Plan:  Blood Pressure: 122/70   Currently at goal     -no home bp logs   -home meds: losartan 25mg and metoprolol succinate XL 25mg BID   Follows with cardiology  -<2 months post STEMI and subsequent placement of drug eluting stent in LAD      -continue above medications and monitor bp  3. Acute pericarditis associated with other disease  Assessment & Plan:  Complications of acute STEMI.  Denies chest pain and remains on colchicine.    Continue colchicine 0.6 mg daily       History of Present Illness     65 yo male with a PMH of STEMI, with recent cath on 24 (single vessel disease, 100% stenosis of the proximal LAD, with successful IVUS guided PCI), HTN, dyslipidemia, and pericarditis who comes to the office for  "medication reconciliation. Patient reports he is taking all for his medications as prescribed, except for the Toprol XL which he forgets to take twice a day, and often takes in only once a day. Patient is currently asymptomatic, denies chest pain, palpitations, lightheadedness, or SOB.         Review of Systems   Constitutional:  Negative for fatigue and fever.   HENT:  Negative for congestion, rhinorrhea, sinus pressure, sinus pain, sore throat and tinnitus.    Eyes:  Negative for visual disturbance.   Respiratory:  Negative for cough, chest tightness and shortness of breath.    Cardiovascular:  Negative for chest pain and leg swelling.   Gastrointestinal:  Negative for abdominal pain, constipation, diarrhea, nausea and vomiting.   Genitourinary:  Negative for difficulty urinating, frequency and urgency.   Musculoskeletal:  Negative for arthralgias.   Skin:  Negative for rash.   Neurological:  Negative for seizures, syncope, light-headedness and headaches.   All other systems reviewed and are negative.      Objective     /70 (BP Location: Right arm, Patient Position: Sitting, Cuff Size: Standard)   Pulse 76   Temp 98.2 °F (36.8 °C) (Temporal)   Resp 18   Ht 5' 10\" (1.778 m)   Wt 72.1 kg (159 lb)   SpO2 97%   BMI 22.81 kg/m²     Physical Exam  Vitals and nursing note reviewed.   Constitutional:       General: He is not in acute distress.     Appearance: He is well-developed.   HENT:      Head: Normocephalic and atraumatic.   Eyes:      Conjunctiva/sclera: Conjunctivae normal.   Cardiovascular:      Rate and Rhythm: Normal rate and regular rhythm.      Pulses: Normal pulses.      Heart sounds: Normal heart sounds. No murmur heard.     No friction rub. No gallop.   Pulmonary:      Effort: Pulmonary effort is normal. No respiratory distress.      Breath sounds: Normal breath sounds.   Abdominal:      Palpations: Abdomen is soft.      Tenderness: There is no abdominal tenderness.   Musculoskeletal:         " General: No swelling.      Cervical back: Neck supple.   Skin:     General: Skin is warm and dry.      Capillary Refill: Capillary refill takes less than 2 seconds.   Neurological:      General: No focal deficit present.      Mental Status: He is alert and oriented to person, place, and time.      Cranial Nerves: No cranial nerve deficit.      Sensory: No sensory deficit.      Motor: No weakness.      Coordination: Coordination normal.      Gait: Gait normal.   Psychiatric:         Mood and Affect: Mood normal.       Administrative Statements   I have spent a total time of 20 minutes in caring for this patient on the day of the visit/encounter including Diagnostic results, Prognosis, Risks and benefits of tx options, Instructions for management, Patient and family education, and Importance of tx compliance.

## 2024-08-26 NOTE — ASSESSMENT & PLAN NOTE
-STEMI on 6/30/24 with placement of drug eluting stent, did not go to rehab  -echo on 7/1/24 with EF 31%  -follows with cardiology has f/u scheduled   -home meds: aspirin, atorvastatin, plavix, losartan and metoprolol  -ASCVD risk score of 14.0-11.3%     Medications reviewed with patient in office, patient has appropriate understanding of the risks of medication non adherence   Patient had f/u schedule with Cardio on 9/18, however will be in [place] at the time and miss the appt   - will reach out to Cardiology office for possible cancellation, and scheduling prior to him leaving   - recommended he be seen by a provider if he is planning on staying in [place] for a prolonged period  - Continue Toprol 25 mg BID, Losartan 25 mg daily, Lipitor 20 mg daily, Aspirin 81 mg daily and Plavix 75 mg daily

## 2024-08-26 NOTE — LETTER
August 26, 2024     Patient: Nolan Escobar  YOB: 1960  Date of Visit: 8/26/2024      To Whom it May Concern:    Nolan Escobar is under my professional care. Nolan was seen in my office on 8/26/2024. Nolan may return to work without restrictions.      If you have any questions or concerns, please don't hesitate to call.         Sincerely,          Vijay Nguyen MD

## 2024-08-26 NOTE — ASSESSMENT & PLAN NOTE
Complications of acute STEMI.  Denies chest pain and remains on colchicine.    Continue colchicine 0.6 mg daily

## 2024-08-26 NOTE — ASSESSMENT & PLAN NOTE
Blood Pressure: 122/70   Currently at goal     -no home bp logs   -home meds: losartan 25mg and metoprolol succinate XL 25mg BID   Follows with cardiology  -<2 months post STEMI and subsequent placement of drug eluting stent in LAD      -continue above medications and monitor bp

## 2025-02-10 ENCOUNTER — OFFICE VISIT (OUTPATIENT)
Dept: FAMILY MEDICINE CLINIC | Facility: CLINIC | Age: 65
End: 2025-02-10

## 2025-02-10 VITALS
SYSTOLIC BLOOD PRESSURE: 150 MMHG | HEIGHT: 70 IN | RESPIRATION RATE: 16 BRPM | BODY MASS INDEX: 20.62 KG/M2 | DIASTOLIC BLOOD PRESSURE: 80 MMHG | TEMPERATURE: 97.3 F | WEIGHT: 144 LBS | HEART RATE: 65 BPM | OXYGEN SATURATION: 99 %

## 2025-02-10 DIAGNOSIS — I10 PRIMARY HYPERTENSION: ICD-10-CM

## 2025-02-10 DIAGNOSIS — Z23 ENCOUNTER FOR IMMUNIZATION: ICD-10-CM

## 2025-02-10 DIAGNOSIS — Z95.5 S/P DRUG ELUTING CORONARY STENT PLACEMENT: Primary | ICD-10-CM

## 2025-02-10 PROBLEM — I31.9 PERICARDITIS: Status: RESOLVED | Noted: 2024-07-02 | Resolved: 2025-02-10

## 2025-02-10 PROCEDURE — 90677 PCV20 VACCINE IM: CPT

## 2025-02-10 PROCEDURE — 90673 RIV3 VACCINE NO PRESERV IM: CPT

## 2025-02-10 PROCEDURE — 99213 OFFICE O/P EST LOW 20 MIN: CPT

## 2025-02-10 PROCEDURE — 90472 IMMUNIZATION ADMIN EACH ADD: CPT

## 2025-02-10 PROCEDURE — 90471 IMMUNIZATION ADMIN: CPT

## 2025-02-10 RX ORDER — METOPROLOL SUCCINATE 25 MG/1
25 TABLET, EXTENDED RELEASE ORAL 2 TIMES DAILY
Qty: 180 TABLET | Refills: 3 | Status: SHIPPED | OUTPATIENT
Start: 2025-02-10 | End: 2025-05-11

## 2025-02-10 RX ORDER — LOSARTAN POTASSIUM 25 MG/1
25 TABLET ORAL DAILY
Qty: 90 TABLET | Refills: 4 | Status: SHIPPED | OUTPATIENT
Start: 2025-02-10 | End: 2025-05-11

## 2025-02-10 RX ORDER — ASPIRIN 81 MG/1
81 TABLET ORAL DAILY
Qty: 90 TABLET | Refills: 4 | Status: SHIPPED | OUTPATIENT
Start: 2025-02-10

## 2025-02-10 RX ORDER — ATORVASTATIN CALCIUM 80 MG/1
80 TABLET, FILM COATED ORAL DAILY
Qty: 100 TABLET | Refills: 3 | Status: SHIPPED | OUTPATIENT
Start: 2025-02-10 | End: 2025-02-10 | Stop reason: CLARIF

## 2025-02-10 RX ORDER — ATORVASTATIN CALCIUM 20 MG/1
20 TABLET, FILM COATED ORAL DAILY
Qty: 30 TABLET | Refills: 1 | Status: SHIPPED | OUTPATIENT
Start: 2025-02-10

## 2025-02-10 RX ORDER — CLOPIDOGREL BISULFATE 75 MG/1
75 TABLET ORAL DAILY
Qty: 90 TABLET | Refills: 4 | Status: SHIPPED | OUTPATIENT
Start: 2025-02-10

## 2025-02-10 NOTE — PROGRESS NOTES
Name: Nolan Ecsobar      : 1960      MRN: [id number]  Encounter Provider: Nettie Cantrell MD  Encounter Date: 2/10/2025   Encounter department: [facility]  :  Assessment & Plan  S/P drug eluting coronary stent placement  24: Had a cardiac cath with stenting (DOMENICO x2 in LAD). EF = 31% on echo.Cannot afford Entresto or empagliplozin.  Followed by Cardiology.  stable  Denies SOB or chest pain.   Continue with aspirin 81 mg daily, Plavix 75 mg daily, losartan 25 mg daily, metoprolol succinate 25 mg every 12 hours, Lipitor from 80 mg daily  (Of note patient was taking aspirin 80 mg qd but was decreaszed to 20 mg due to interactive effect with colchicine). Patient no longer taking colchicine and is to schedule appointment with cardiology.   Reports he jusrt came back from [place] and obtained cardiac rehab while he was down there however he brings no documentation of this.    -Continue home meds  -[facility] appointment w/cardiology  -F/U w/PCP   Orders:    aspirin 81 mg EC tablet; Take 1 tablet (81 mg total) by mouth daily    clopidogrel (PLAVIX) 75 mg tablet; Take 1 tablet (75 mg total) by mouth daily    metoprolol succinate (TOPROL-XL) 25 mg 24 hr tablet; Take 1 tablet (25 mg total) by mouth 2 (two) times a day    losartan (COZAAR) 25 mg tablet; Take 1 tablet (25 mg total) by mouth daily    atorvastatin (LIPITOR) 20 mg tablet; Take 1 tablet (20 mg total) by mouth daily    Primary hypertension  Blood Pressure: 150/80     -Continue home meds: losartan 25mg and metoprolol succinate XL 25mg BID      -Continue above medications and monitor bp  -F/U w/pcp  -Continue low salt diet         Encounter for immunization  Needs RSV vaccine ( can gt it at local pharamcy)  Our clinic is currently out of shingles vaccine ( please inform patient when they arrive as he needs it)   Orders:    influenza vaccine, recombinant, PF, 0.5 mL IM (Flublok)    Pneumococcal  "Conjugate Vaccine 20-valent (Pcv20)           History of Present Illness   65 yo male with a pmh of s/p drug eluting coronary stent placement due to MI on 6/30/24 presents for follow up. [place] was originally scheduled to be seen by cardiology in September however he missed appointment due to being in [facility] [place]. He was scheduled to start cardiac rehab treatment however states he did not complete it as he was in [facility] and reports he completed cardiac rehab there. Patient told to please provide documentation of this and that he must follow up with cardiology as soon as possible. He continues taking his medications as prescribed and refills were provided during encounter. He reports doing well and denies sob, chest pain, palpitations, numbness, tingleness, n/v/d/c or abdominal pain.      Review of Systems   Constitutional:  Negative for chills and fever.   HENT:  Negative for ear pain and sore throat.    Eyes:  Negative for pain and visual disturbance.   Respiratory:  Negative for cough and shortness of breath.    Cardiovascular:  Negative for chest pain and palpitations.   Gastrointestinal:  Negative for abdominal pain and vomiting.   Genitourinary:  Negative for dysuria and hematuria.   Musculoskeletal:  Negative for arthralgias and back pain.   Skin:  Negative for color change and rash.   Neurological:  Negative for seizures and syncope.   All other systems reviewed and are negative.      Objective   /80 (BP Location: Left arm, Patient Position: Sitting, Cuff Size: Standard)   Pulse 65   Temp (!) 97.3 °F (36.3 °C) (Temporal)   Resp 16   Ht 5' 10\" (1.778 m)   Wt 65.3 kg (144 lb)   SpO2 99%   BMI 20.66 kg/m²      Physical Exam  Vitals and nursing note reviewed.   Constitutional:       General: He is not in acute distress.     Appearance: He is well-developed.   HENT:      Head: Normocephalic and atraumatic.   Eyes:      Conjunctiva/sclera: Conjunctivae normal.   Cardiovascular:     "  Rate and Rhythm: Normal rate and regular rhythm.      Heart sounds: No murmur heard.  Pulmonary:      Effort: Pulmonary effort is normal. No respiratory distress.      Breath sounds: Normal breath sounds.   Abdominal:      Palpations: Abdomen is soft.      Tenderness: There is no abdominal tenderness.   Musculoskeletal:         General: No swelling.      Cervical back: Neck supple.   Skin:     General: Skin is warm and dry.      Capillary Refill: Capillary refill takes less than 2 seconds.   Neurological:      Mental Status: He is alert.   Psychiatric:         Mood and Affect: Mood normal.

## 2025-02-10 NOTE — ASSESSMENT & PLAN NOTE
Blood Pressure: 150/80     -Continue home meds: losartan 25mg and metoprolol succinate XL 25mg BID      -Continue above medications and monitor bp  -F/U w/pcp  -Continue low salt diet

## 2025-02-10 NOTE — PATIENT INSTRUCTIONS
Por favor hacer visita con cardiologo a el cammie [phone]  Continuar seguimiento medico con tu doctora de cabecera

## 2025-02-10 NOTE — ASSESSMENT & PLAN NOTE
Stable  Denies SOB or chest pain.   Continues with aspirin 81 mg daily, Plavix 75 mg daily, losartan 25 mg daily, metoprolol succinate 25 mg every 12 hours, Lipitor from 80 mg daily  (Of note patient was taking aspirin 80 mg qd but was decreaszed to 20 mg due to interactive effect with colchicine). Patient no longer taking colchicine and is to schedule appointment with cardiology.   Reports he jusrt came back from [place] and obtained cardiac rehab while he was down there however he brings no documentation of this.    -Continue home meds  -[facility] appointment w/cardiology  -F/U w/PCP

## 2025-02-23 NOTE — PROGRESS NOTES
Cardiology Follow Up    Nolan Escobar  1960  [id number]  [facility] [place]  [address]  [place] [id number] [phone]    Assessment & Plan  Ischemic cardiomyopathy  LVEF 31% NYHA class II stage B did not have health insurance was not a candidate for LifeVest.  Did not undergo follow-up TTE.  On PE euvolemic compensated.  Continue on metoprolol succinate 25 mg twice daily, losartan 25 mg daily, requested Mr. Escobar follow-up TTE in the near future evaluate LVEF.  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  S/P drug eluting coronary stent placement  6/30/24 status post drug-eluting stent x 2 to 100% stenosis of LAD.  SP O[place]ield frontier drug-eluting stent x 2, 0% residual stenosis, continue on aspirin 81 mg daily, Plavix 75 mg daily for 1 year.  Continue metoprolol succinate 25 mg every 12 hours, losartan 25 mg daily, Lipitor 20 mg daily.  Will stop colchicine and  evaluate lipid panel  Heart healthy diet Daily exercise  Dyslipidemia  Continue Lipitor 20 mg daily due to colchicine which will be stopped .   Lipid panel to be done in the near future  Primary hypertension  Blood pressure well-controlled on losartan 25 mg daily, metoprolol succinate 25 mg twice daily, DASH diet   Encounter for screening colonoscopy        Interval History:   Mr Nolan Escobar was admitted to [facility] on 6/30 - 7/03/24 with Acute AWSTEMI.  He presented to the emergency room with chest pain.  He showed anterior wall ST segment elevation. 6/30/24 emergent cardiac catheterization which showed 100% stenosis in the proximal LAD, sp  Orefield frontier drug-eluting stent x 2, 0% residual stenosis.  TTE: Left ventricle cavity size normal  LVEF 31%, systolic to mildly reduced.  Diastolic function mildly abnormal consistent with grade 1 abnormal relaxation.  This can apex  akinetic mid anterior septal apical anterior apical septal apical inferior and apical lateral walls.  Hypokinetic basal anteroseptal and mid anterior mid inferior and mid inferior lateral walls.  All other segments normal.  RV size systolic function normal.  No significant valvular disease.  Pericardium normal in appearance no pericardial effusion.  6/30/24 C2 32  HDL 60 , hemoglobin A1c 5.8 initially started on Brilinta but due to lack of insurance started on Plavix.  Post MI pericarditis and started on colchicine 0.6 mg twice daily.  Due to lack of insurance he was not a candidate for Jardiance Entresto or Augmi Labs.    On 7/16/24 Mr Escobar was seen in our office for a follow up visit.  He was accompanied  by a family member who is interpreting for him.  He denied dyspnea with minimal moderate exertion chest pain palpitation lightheadedness or dizziness.  He was taking all his medications as prescribed.  Requesting disability.  He was instructed to bring disability paperwork into the office.  TTE was ordered at this office visit to be done in 3 months.      Mr Escobar presents our office for follow-up visit.  Since he was last seen Mr. Escobar was in the [facility] and was checked out.  He denies dyspnea with minimal or moderate exertion chest pain palpitations lightheadedness or dizziness.  Mr Milian is following a heart healthy diet and walking long distances throughout the day.  He did not undergo TTE.  He is requesting a letter to return to work.  I have instructed him to undergo TTE to evaluate LVEF prior to providing a return to work letter.  Morning to Mr. Escobar he packages cookies for [employer].        Medical History  Primary Cardiologist  Primary Language Tristanian  Hypertension  Dyslipidemia 630/24 , , HDL 60,     Patient Active Problem List   Diagnosis    Acute ST elevation myocardial infarction (STEMI) involving left anterior descending (LAD) coronary artery  (HCC)    Dyslipidemia    Hypertension    Acute systolic congestive heart failure (HCC)    Status post insertion of drug-eluting stent into left anterior descending (LAD) artery     No past medical history on file.  Social History     Socioeconomic History    Marital status: Single     Spouse name: Not on file    Number of children: Not on file    Years of education: Not on file    Highest education level: Not on file   Occupational History    Not on file   Tobacco Use    Smoking status: Never     Passive exposure: Never    Smokeless tobacco: Never   Vaping Use    Vaping status: Never Used   Substance and Sexual Activity    Alcohol use: Not Currently    Drug use: Never    Sexual activity: Not on file   Other Topics Concern    Not on file   Social History Narrative    Not on file     Social Drivers of Health     Financial Resource Strain: Low Risk  (7/9/2024)    Overall Financial Resource Strain (CARDIA)     Difficulty of Paying Living Expenses: Not hard at all   Food Insecurity: No Food Insecurity (7/1/2024)    Nursing - Inadequate Food Risk Classification     Worried About Running Out of Food in the Last Year: Never true     Ran Out of Food in the Last Year: Never true     Ran Out of Food in the Last Year: Not on file   Transportation Needs: No Transportation Needs (7/1/2024)    PRAPARE - Transportation     Lack of Transportation (Medical): No     Lack of Transportation (Non-Medical): No   Physical Activity: Not on file   Stress: Not on file   Social Connections: Not on file   Intimate Partner Violence: Not on file   Housing Stability: Low Risk  (7/1/2024)    Housing Stability Vital Sign     Unable to Pay for Housing in the Last Year: No     Number of Times Moved in the Last Year: 0     Homeless in the Last Year: No      No family history on file.  Past Surgical History:   Procedure Laterality Date    CARDIAC CATHETERIZATION N/A 6/30/2024    Procedure: Cardiac pci;  Surgeon: Jovany Wise MD;  Location: [facility];  Service: Cardiology    CARDIAC CATHETERIZATION N/A 6/30/2024    Procedure: Cardiac Coronary Angiogram;  Surgeon: Jovany Wise MD;  Location: [facility];  Service: Cardiology    CARDIAC CATHETERIZATION N/A 6/30/2024    Procedure: Cardiac IVUS;  Surgeon: Jovany Wise MD;  Location: [facility];  Service: Cardiology    CARDIAC CATHETERIZATION Left 6/30/2024    Procedure: Cardiac Left Heart Cath;  Surgeon: Jovany Wise MD;  Location: [facility];  Service: Cardiology       Current Outpatient Medications:     aspirin 81 mg EC tablet, Take 1 tablet (81 mg total) by mouth daily, Disp: 90 tablet, Rfl: 4    atorvastatin (LIPITOR) 20 mg tablet, Take 1 tablet (20 mg total) by mouth daily, Disp: 30 tablet, Rfl: 1    clopidogrel (PLAVIX) 75 mg tablet, Take 1 tablet (75 mg total) by mouth daily, Disp: 90 tablet, Rfl: 4    colchicine (COLCRYS) 0.6 mg tablet, Take 1 tablet (0.6 mg total) by mouth daily, Disp: 30 tablet, Rfl: 0    losartan (COZAAR) 25 mg tablet, Take 1 tablet (25 mg total) by mouth daily, Disp: 90 tablet, Rfl: 4    metoprolol succinate (TOPROL-XL) 25 mg 24 hr tablet, Take 1 tablet (25 mg total) by mouth 2 (two) times a day, Disp: 180 tablet, Rfl: 3  No Known Allergies    Labs:  No visits with results within 2 Month(s) from this visit.   Latest known visit with results is:   No results displayed because visit has over 200 results.        Imaging: XR chest portable    Result Date: 7/3/2024  Narrative: XR CHEST PORTABLE INDICATION: rule out PNA. COMPARISON: None FINDINGS: Clear lungs. No pneumothorax or pleural effusion. Normal cardiomediastinal silhouette. Bones are unremarkable for age. Normal upper abdomen.     Impression: No acute cardiopulmonary disease. Workstation performed: [id number]     Echo complete w/ contrast if indicated    Result Date: 7/1/2024  Narrative:   Left Ventricle: Left ventricular cavity size is normal. Wall thickness is mildly increased. The left ventricular  ejection fraction is 31% by biplane measurement. Systolic function is moderately reduced. Global longitudinal strain is reduced at -6%. Diastolic function is mildly abnormal, consistent with grade I (abnormal) relaxation.   The following segments are dyskinetic: apex.   The following segments are akinetic: mid anteroseptal, apical anterior, apical septal, apical inferior and apical lateral.   The following segments are hypokinetic: basal anteroseptal, mid anterior, mid inferior and mid inferolateral.   All other segments are normal.   Mitral Valve: There is mild regurgitation.   Tricuspid Valve: There is mild regurgitation. There is severe mid to apical hypokinesis/akinesis with dyskinesis of the true apex itself.  This is consistent with large LAD territory infarct.     XR chest 1 view portable    Result Date: 7/1/2024  Narrative: XR CHEST PORTABLE INDICATION: MI. COMPARISON: None FINDINGS: Clear lungs. No pneumothorax or pleural effusion. Normal cardiomediastinal silhouette. Bones are unremarkable for age. Normal upper abdomen.     Impression: No acute cardiopulmonary disease. Workstation performed: [id number]     Cardiac catheterization    Result Date: 6/30/2024  Narrative:   Prox LAD lesion is 100% stenosed.   The angioplasty was pre-stent angioplasty. Single-vessel coronary artery disease.  There is a 100% stenosis of the proximal LAD, representing the culprit for the patient's anterior STEMI.  Other vessels were free of apparent atherosclerosis. Successful IVUS-guided PCI, proximal LAD, with reduction in stenosis from 100% to 0% and improvement in ERIBERTO flow from 0-3 following placement of a 3.0x26mm drug-eluding stent and post-dilation of the proximal and mid stent with a 3.25mm balloon. Markedly elevated LVEDP (35 mmHg).        Review of Systems:  Review of Systems   All other systems reviewed and are negative.      Physical Exam:  Physical Exam  Vitals reviewed.   Constitutional:       Appearance: Normal  appearance.   Cardiovascular:      Rate and Rhythm: Normal rate and regular rhythm.      Pulses: Normal pulses.      Heart sounds: Normal heart sounds.   Pulmonary:      Effort: Pulmonary effort is normal.      Breath sounds: Normal breath sounds.   Musculoskeletal:         General: Normal range of motion.      Cervical back: Normal range of motion and neck supple.      Right lower leg: No edema.      Left lower leg: No edema.   Skin:     General: Skin is warm and dry.      Capillary Refill: Capillary refill takes less than 2 seconds.      Comments: Right radial cath site appears healed   Neurological:      General: No focal deficit present.      Mental Status: He is alert and oriented to person, place, and time.   Psychiatric:         Mood and Affect: Mood normal.         Behavior: Behavior normal.

## 2025-02-24 ENCOUNTER — TELEPHONE (OUTPATIENT)
Dept: CARDIOLOGY CLINIC | Facility: CLINIC | Age: 65
End: 2025-02-24

## 2025-02-24 ENCOUNTER — OFFICE VISIT (OUTPATIENT)
Dept: CARDIOLOGY CLINIC | Facility: CLINIC | Age: 65
End: 2025-02-24
Payer: COMMERCIAL

## 2025-02-24 VITALS
WEIGHT: 151.7 LBS | BODY MASS INDEX: 21.72 KG/M2 | HEART RATE: 76 BPM | OXYGEN SATURATION: 100 % | SYSTOLIC BLOOD PRESSURE: 130 MMHG | HEIGHT: 70 IN | DIASTOLIC BLOOD PRESSURE: 70 MMHG

## 2025-02-24 DIAGNOSIS — Z12.11 ENCOUNTER FOR SCREENING COLONOSCOPY: ICD-10-CM

## 2025-02-24 DIAGNOSIS — I25.5 ISCHEMIC CARDIOMYOPATHY: Primary | ICD-10-CM

## 2025-02-24 DIAGNOSIS — Z95.5 S/P DRUG ELUTING CORONARY STENT PLACEMENT: ICD-10-CM

## 2025-02-24 DIAGNOSIS — E78.5 DYSLIPIDEMIA: ICD-10-CM

## 2025-02-24 DIAGNOSIS — I10 PRIMARY HYPERTENSION: ICD-10-CM

## 2025-02-24 PROCEDURE — 99214 OFFICE O/P EST MOD 30 MIN: CPT | Performed by: NURSE PRACTITIONER

## 2025-02-24 NOTE — TELEPHONE ENCOUNTER
Received call back from pt's son and pt.  Added  Brigida #[id number] to call.  He called to confirm medication to stop- Colchicine.  He also called to inquire about pt's return to work letter. Advised him that pt needs to complete echo first.  It is currently scheduled 5/2, provided him central scheduling phone number so he can try to move it up.

## 2025-02-24 NOTE — TELEPHONE ENCOUNTER
----- Message from DENYNS Montoya sent at 2/24/2025  9:03 AM EST -----  Regarding: phone call  Hi Angel can you please call Mr Escobar and inform him to stop Colchicine.  Thank you Gila    Spoke with pts son re: pt to stop colchcine.

## 2025-02-24 NOTE — ASSESSMENT & PLAN NOTE
Blood pressure well-controlled on losartan 25 mg daily, metoprolol succinate 25 mg twice daily, DASH diet

## 2025-02-24 NOTE — ASSESSMENT & PLAN NOTE
Continue Lipitor 20 mg daily due to colchicine which will be stopped .   Lipid panel to be done in the near future

## (undated) DEVICE — GUIDEWIRE WHOLEY .035 145 CM FLOP STR TIP

## (undated) DEVICE — RUNTHROUGH NS EXTRA FLOPPY PTCA GUIDEWIRE: Brand: RUNTHROUGH

## (undated) DEVICE — GUIDEWIRE .035 260CM 3MM J TIP

## (undated) DEVICE — RADIFOCUS OPTITORQUE ANGIOGRAPHIC CATHETER: Brand: OPTITORQUE

## (undated) DEVICE — Device

## (undated) DEVICE — TR BAND RADIAL ARTERY COMPRESSION DEVICE: Brand: TR BAND

## (undated) DEVICE — GLIDESHEATH BASIC HYDROPHILIC COATED INTRODUCER SHEATH: Brand: GLIDESHEATH

## (undated) DEVICE — CATH GUIDE LAUNCHER 6FR EBU 3.5

## (undated) DEVICE — CORONARY IMAGING CATHETER: Brand: OPTICROSS™ 6 HD

## (undated) DEVICE — BALLOON EUPHORA RX 2.5 X 15MM